# Patient Record
Sex: MALE | Race: WHITE | NOT HISPANIC OR LATINO | URBAN - METROPOLITAN AREA
[De-identification: names, ages, dates, MRNs, and addresses within clinical notes are randomized per-mention and may not be internally consistent; named-entity substitution may affect disease eponyms.]

---

## 2018-11-13 ENCOUNTER — INPATIENT (INPATIENT)
Facility: HOSPITAL | Age: 78
LOS: 6 days | Discharge: NURSING, REHAB OR LONG-TERM CARE | End: 2018-11-20
Attending: INTERNAL MEDICINE | Admitting: INTERNAL MEDICINE

## 2018-11-14 VITALS
HEART RATE: 84 BPM | DIASTOLIC BLOOD PRESSURE: 74 MMHG | SYSTOLIC BLOOD PRESSURE: 142 MMHG | OXYGEN SATURATION: 95 % | RESPIRATION RATE: 18 BRPM | TEMPERATURE: 97 F

## 2018-11-14 DIAGNOSIS — Z98.890 OTHER SPECIFIED POSTPROCEDURAL STATES: Chronic | ICD-10-CM

## 2018-11-14 LAB
ALBUMIN SERPL ELPH-MCNC: 4.1 G/DL — SIGNIFICANT CHANGE UP (ref 3.5–5.2)
ALP SERPL-CCNC: 109 U/L — SIGNIFICANT CHANGE UP (ref 30–115)
ALT FLD-CCNC: 35 U/L — SIGNIFICANT CHANGE UP (ref 0–41)
ANION GAP SERPL CALC-SCNC: 13 MMOL/L — SIGNIFICANT CHANGE UP (ref 7–14)
APTT BLD: 25.1 SEC — LOW (ref 27–39.2)
AST SERPL-CCNC: 36 U/L — SIGNIFICANT CHANGE UP (ref 0–41)
BASOPHILS # BLD AUTO: 0.08 K/UL — SIGNIFICANT CHANGE UP (ref 0–0.2)
BASOPHILS NFR BLD AUTO: 0.5 % — SIGNIFICANT CHANGE UP (ref 0–1)
BILIRUB SERPL-MCNC: 0.2 MG/DL — SIGNIFICANT CHANGE UP (ref 0.2–1.2)
BUN SERPL-MCNC: 29 MG/DL — HIGH (ref 10–20)
CALCIUM SERPL-MCNC: 8.8 MG/DL — SIGNIFICANT CHANGE UP (ref 8.5–10.1)
CHLORIDE SERPL-SCNC: 105 MMOL/L — SIGNIFICANT CHANGE UP (ref 98–110)
CO2 SERPL-SCNC: 23 MMOL/L — SIGNIFICANT CHANGE UP (ref 17–32)
CREAT SERPL-MCNC: 1.6 MG/DL — HIGH (ref 0.7–1.5)
EOSINOPHIL # BLD AUTO: 0.08 K/UL — SIGNIFICANT CHANGE UP (ref 0–0.7)
EOSINOPHIL NFR BLD AUTO: 0.5 % — SIGNIFICANT CHANGE UP (ref 0–8)
GLUCOSE SERPL-MCNC: 121 MG/DL — HIGH (ref 70–99)
HCT VFR BLD CALC: 39.9 % — LOW (ref 42–52)
HGB BLD-MCNC: 13.3 G/DL — LOW (ref 14–18)
IMM GRANULOCYTES NFR BLD AUTO: 0.4 % — HIGH (ref 0.1–0.3)
INR BLD: 1.08 RATIO — SIGNIFICANT CHANGE UP (ref 0.65–1.3)
LYMPHOCYTES # BLD AUTO: 1.72 K/UL — SIGNIFICANT CHANGE UP (ref 1.2–3.4)
LYMPHOCYTES # BLD AUTO: 11.7 % — LOW (ref 20.5–51.1)
MCHC RBC-ENTMCNC: 28.2 PG — SIGNIFICANT CHANGE UP (ref 27–31)
MCHC RBC-ENTMCNC: 33.3 G/DL — SIGNIFICANT CHANGE UP (ref 32–37)
MCV RBC AUTO: 84.7 FL — SIGNIFICANT CHANGE UP (ref 80–94)
MONOCYTES # BLD AUTO: 0.79 K/UL — HIGH (ref 0.1–0.6)
MONOCYTES NFR BLD AUTO: 5.4 % — SIGNIFICANT CHANGE UP (ref 1.7–9.3)
NEUTROPHILS # BLD AUTO: 11.92 K/UL — HIGH (ref 1.4–6.5)
NEUTROPHILS NFR BLD AUTO: 81.5 % — HIGH (ref 42.2–75.2)
NRBC # BLD: 0 /100 WBCS — SIGNIFICANT CHANGE UP (ref 0–0)
PLATELET # BLD AUTO: 146 K/UL — SIGNIFICANT CHANGE UP (ref 130–400)
POTASSIUM SERPL-MCNC: 4.5 MMOL/L — SIGNIFICANT CHANGE UP (ref 3.5–5)
POTASSIUM SERPL-SCNC: 4.5 MMOL/L — SIGNIFICANT CHANGE UP (ref 3.5–5)
PROT SERPL-MCNC: 6.6 G/DL — SIGNIFICANT CHANGE UP (ref 6–8)
PROTHROM AB SERPL-ACNC: 11.7 SEC — SIGNIFICANT CHANGE UP (ref 9.95–12.87)
RBC # BLD: 4.71 M/UL — SIGNIFICANT CHANGE UP (ref 4.7–6.1)
RBC # FLD: 16 % — HIGH (ref 11.5–14.5)
SODIUM SERPL-SCNC: 141 MMOL/L — SIGNIFICANT CHANGE UP (ref 135–146)
WBC # BLD: 14.65 K/UL — HIGH (ref 4.8–10.8)
WBC # FLD AUTO: 14.65 K/UL — HIGH (ref 4.8–10.8)

## 2018-11-14 RX ORDER — AMLODIPINE BESYLATE 2.5 MG/1
5 TABLET ORAL DAILY
Qty: 0 | Refills: 0 | Status: DISCONTINUED | OUTPATIENT
Start: 2018-11-14 | End: 2018-11-16

## 2018-11-14 RX ORDER — BRIGATINIB 90MG-180MG
0 KIT ORAL
Qty: 0 | Refills: 0 | COMMUNITY

## 2018-11-14 RX ORDER — MORPHINE SULFATE 50 MG/1
4 CAPSULE, EXTENDED RELEASE ORAL ONCE
Qty: 0 | Refills: 0 | Status: DISCONTINUED | OUTPATIENT
Start: 2018-11-14 | End: 2018-11-14

## 2018-11-14 RX ORDER — MORPHINE SULFATE 50 MG/1
3 CAPSULE, EXTENDED RELEASE ORAL ONCE
Qty: 0 | Refills: 0 | Status: DISCONTINUED | OUTPATIENT
Start: 2018-11-14 | End: 2018-11-14

## 2018-11-14 RX ORDER — MORPHINE SULFATE 50 MG/1
4 CAPSULE, EXTENDED RELEASE ORAL EVERY 4 HOURS
Qty: 0 | Refills: 0 | Status: DISCONTINUED | OUTPATIENT
Start: 2018-11-14 | End: 2018-11-15

## 2018-11-14 RX ORDER — MORPHINE SULFATE 50 MG/1
4 CAPSULE, EXTENDED RELEASE ORAL ONCE
Qty: 0 | Refills: 0 | Status: COMPLETED | OUTPATIENT
Start: 2018-11-14 | End: 2018-11-14

## 2018-11-14 RX ORDER — HEPARIN SODIUM 5000 [USP'U]/ML
5000 INJECTION INTRAVENOUS; SUBCUTANEOUS EVERY 8 HOURS
Qty: 0 | Refills: 0 | Status: DISCONTINUED | OUTPATIENT
Start: 2018-11-14 | End: 2018-11-16

## 2018-11-14 RX ORDER — HYDROMORPHONE HYDROCHLORIDE 2 MG/ML
2 INJECTION INTRAMUSCULAR; INTRAVENOUS; SUBCUTANEOUS ONCE
Qty: 0 | Refills: 0 | Status: DISCONTINUED | OUTPATIENT
Start: 2018-11-14 | End: 2018-11-14

## 2018-11-14 RX ORDER — SODIUM CHLORIDE 9 MG/ML
1000 INJECTION, SOLUTION INTRAVENOUS
Qty: 0 | Refills: 0 | Status: DISCONTINUED | OUTPATIENT
Start: 2018-11-14 | End: 2018-11-16

## 2018-11-14 RX ORDER — PANTOPRAZOLE SODIUM 20 MG/1
40 TABLET, DELAYED RELEASE ORAL
Qty: 0 | Refills: 0 | Status: DISCONTINUED | OUTPATIENT
Start: 2018-11-14 | End: 2018-11-16

## 2018-11-14 RX ORDER — HYDROMORPHONE HYDROCHLORIDE 2 MG/ML
1 INJECTION INTRAMUSCULAR; INTRAVENOUS; SUBCUTANEOUS ONCE
Qty: 0 | Refills: 0 | Status: DISCONTINUED | OUTPATIENT
Start: 2018-11-14 | End: 2018-11-14

## 2018-11-14 RX ORDER — ONDANSETRON 8 MG/1
4 TABLET, FILM COATED ORAL EVERY 8 HOURS
Qty: 0 | Refills: 0 | Status: DISCONTINUED | OUTPATIENT
Start: 2018-11-15 | End: 2018-11-16

## 2018-11-14 RX ORDER — ONDANSETRON 8 MG/1
4 TABLET, FILM COATED ORAL ONCE
Qty: 0 | Refills: 0 | Status: COMPLETED | OUTPATIENT
Start: 2018-11-14 | End: 2018-11-14

## 2018-11-14 RX ADMIN — HEPARIN SODIUM 5000 UNIT(S): 5000 INJECTION INTRAVENOUS; SUBCUTANEOUS at 15:26

## 2018-11-14 RX ADMIN — MORPHINE SULFATE 4 MILLIGRAM(S): 50 CAPSULE, EXTENDED RELEASE ORAL at 01:59

## 2018-11-14 RX ADMIN — MORPHINE SULFATE 4 MILLIGRAM(S): 50 CAPSULE, EXTENDED RELEASE ORAL at 16:24

## 2018-11-14 RX ADMIN — MORPHINE SULFATE 4 MILLIGRAM(S): 50 CAPSULE, EXTENDED RELEASE ORAL at 23:54

## 2018-11-14 RX ADMIN — MORPHINE SULFATE 4 MILLIGRAM(S): 50 CAPSULE, EXTENDED RELEASE ORAL at 15:49

## 2018-11-14 RX ADMIN — HYDROMORPHONE HYDROCHLORIDE 2 MILLIGRAM(S): 2 INJECTION INTRAMUSCULAR; INTRAVENOUS; SUBCUTANEOUS at 20:03

## 2018-11-14 RX ADMIN — HEPARIN SODIUM 5000 UNIT(S): 5000 INJECTION INTRAVENOUS; SUBCUTANEOUS at 23:25

## 2018-11-14 RX ADMIN — SODIUM CHLORIDE 75 MILLILITER(S): 9 INJECTION, SOLUTION INTRAVENOUS at 14:26

## 2018-11-14 RX ADMIN — MORPHINE SULFATE 4 MILLIGRAM(S): 50 CAPSULE, EXTENDED RELEASE ORAL at 23:24

## 2018-11-14 RX ADMIN — HYDROMORPHONE HYDROCHLORIDE 1 MILLIGRAM(S): 2 INJECTION INTRAMUSCULAR; INTRAVENOUS; SUBCUTANEOUS at 06:43

## 2018-11-14 RX ADMIN — ONDANSETRON 4 MILLIGRAM(S): 8 TABLET, FILM COATED ORAL at 23:23

## 2018-11-14 NOTE — H&P ADULT - HISTORY OF PRESENT ILLNESS
78 years old male pt with past medical hx of HTN for 3 years, anxiety, stage 4 lung carcinoma (non small cell carcinoma) on brigatinib for 6 months transferred from [facility] s/p mechanical fall yesterday.  Patient was in usual state till yesterday morning, when he tripped while turning around in his kitchen, he denies any preceding symptoms like chest pain, palpitation, dizziness, visual symptoms. After fall, he started having pain his right hip according to him 10/10 sharp pain, non radiating, was unable to move his right leg afterward, was not able to stand or walk later.  They called ambulance at 11pm, went to [facility], transferred to [place] at 4 am  In [facility] ER, xray pelvis showed fracture of right  femoral head.  he has on /off shortness of breath with minimal exertion for last 1 years, he saw his primary and was told because of fluid in lungs, is able to walk, needs help for his daily activities.  he denies any weight changes, no cough, is feeling weak and lethargic lately, also complaints of tingling sensation in his feet bilaterally after starting chemotherapy.  he denies any fever, no nausea, no vomiting, no urinary complaints

## 2018-11-14 NOTE — ED PROVIDER NOTE - MEDICAL DECISION MAKING DETAILS
78yM pmhx stage 4Lung Ca   HTN presents s/p mechanical fall - pt uses walker  and for long distances wheelchair -  tonight while getting out of bed,  went to turn , crossed his right foot over left  and fall backward -  family heard bang-  pt no LOC no vomitin g  no headache neck pain, pt with chronic paresthesias to feet, no  chest deshaun abdominal pain   pt c/o pain to right hip.  unable to walk afterwards. PE alert nontoxic  head atraumatic cvs rrr resp cta  abd soft nonteder   ttp right hip, externally rotated shortened  2 + DP pulse, left le from ankle knee hip, wrist elbow shoulder from -  no ecchymosis.  no vertebral or c spine tenderness -plan xray ct   xray right subcapital hip fracture -  transfer north  for ortho

## 2018-11-14 NOTE — H&P ADULT - ATTENDING COMMENTS
pt seen and examined independently, I have read and agree with above exam and poa,    c/o severe pain rle,    intermed risk for mod risk surgery with good functional capacity    proceed with or    add metoprolo for htn    case discussed in detail with kane

## 2018-11-14 NOTE — ED ADULT NURSE REASSESSMENT NOTE - NS ED NURSE REASSESS COMMENT FT1
patient is a south site transfer for ortho eval. pt A &ox4, VSS in triage. patient c/o nausea and right hip pain. patient has a 18g to the  AC from the [facility].

## 2018-11-14 NOTE — H&P ADULT - NSHPLABSRESULTS_GEN_ALL_CORE
11-14    141  |  105  |  29<H>  ----------------------------<  121<H>  4.5   |  23  |  1.6<H>    Ca    8.8      14 Nov 2018 02:30    TPro  6.6  /  Alb  4.1  /  TBili  0.2  /  DBili  x   /  AST  36  /  ALT  35  /  AlkPhos  109  11-14                          13.3   14.65 )-----------( 146      ( 14 Nov 2018 01:45 )             39.9     Xray Pelvis AP only (11.14.18 @ 02:11) >    Findings/  Impression:     Diffuse osteopenia.    There is an acute minimally displaced subcapital fracture of the right   femoral head.     Mild degenerative changes of the bilateral hips.    There is cortical thickening and enlargement of the right iliopectineal   line reflecting evidence of sagittal disease.       CT Cervical Spine No Cont (11.14.18 @ 05:40) >      IMPRESSION:    No evidence of a cervical spine fracture.    Trace anterolisthesis of C4 on C5 likely degenerative in etiology.    Straightening of the cervical lordosis secondary to positioning or muscle   spasm.    Moderate multilevel degenerative changes.     CT Head No Cont (11.14.18 @ 05:40) >      Impression:       No CT evidence of acute intracranial pathology.    Mild chronic microvascular ischemic changes.

## 2018-11-14 NOTE — ED PROVIDER NOTE - PHYSICAL EXAMINATION
Physical Exam    Vital Signs: I have reviewed the initial vital signs.  Constitutional: well-nourished, appears stated age, no acute distress  Eyes: Conjunctiva pink, Sclera clear, PERRLA, EOMI.  Cardiovascular: S1 and S2, regular rate, regular rhythm, well-perfused extremities, radial pulses equal and 2+  Respiratory: unlabored respiratory effort, clear to auscultation bilaterally no wheezing, rales and rhonchi  Gastrointestinal: soft, non-tender abdomen, no pulsatile mass, normal bowl sounds  Musculoskeletal: supple neck, no lower extremity edema, no midline tenderness + right leg shortened and externally rotated. + right hip tenderness. good and equal DP pulse.   Integumentary: warm, dry, no rash  Neurologic: awake, alert, cranial nerves II-XII grossly intact, extremities’ motor and sensory functions grossly intact  Psychiatric: appropriate mood, appropriate affect

## 2018-11-14 NOTE — H&P ADULT - NSHPSOCIALHISTORY_GEN_ALL_CORE
non smoker, occasional alcohol or beer once per week  denies any illicit drugs  retired, his nephew takes care of him

## 2018-11-14 NOTE — H&P ADULT - NSHPPHYSICALEXAM_GEN_ALL_CORE
Vital Signs Last 24 Hrs  T(C): 36.4 (14 Nov 2018 08:27), Max: 36.4 (14 Nov 2018 08:27)  T(F): 97.6 (14 Nov 2018 08:27), Max: 97.6 (14 Nov 2018 08:27)  HR: 78 (14 Nov 2018 08:27) (78 - 84)  BP: 169/75 (14 Nov 2018 08:27) (142/74 - 169/75)  RR: 18 (14 Nov 2018 08:27) (18 - 18)  SpO2: 95% (14 Nov 2018 08:27) (95% - 95%)      alert oriented , in acute pain  looks dehydrated on exam, dry mucus membrane  chest clear bilaterally  cvs s1 and s2 no added sound  abdomen soft lax no organomegaly  no pedal edema  unable to do both active and passive movements on right leg

## 2018-11-14 NOTE — ED PROVIDER NOTE - NS ED ROS FT
Constitutional: (-) fever  Eyes/ENT: (-) blurry vision, (-) epistaxis  Cardiovascular: (-) chest pain, (-) syncope  Respiratory: (-) cough, (-) shortness of breath  Gastrointestinal: (-) vomiting, (-) diarrhea  Musculoskeletal: (-) neck pain, (-) back pain, (+) joint pain  Integumentary: (-) rash, (-) edema  Neurological: (-) headache, (-) altered mental status  Psychiatric: (-) hallucinations  Allergic/Immunologic: (-) pruritus

## 2018-11-14 NOTE — ED PROVIDER NOTE - OBJECTIVE STATEMENT
78 year old male past medical history of stage 4 lung ca, Hypertension comes to the emergency room after trip and fall. tp states that he was walking with walker to get out of bed and fell. no head injury no loss of consciousness. patient was unable to stand and called for help. family found patient on floor and complaining of right hip pain. no neck or back pain. no chest pain no shortness of breath.

## 2018-11-14 NOTE — H&P ADULT - ASSESSMENT
78 years old male pt with past medical hx of HTN for 3 years, anxiety, stage 4 lung carcinoma (non small cell carcinoma) on brigatinib for 6 months transferred from [facility] s/p mechanical fall yesterday.    # s/p mechanical fall     right femoral head fracture     will consult orthopedics     morphine 4 mg iv q4 hourly PRN     will check vitamin d 25, TSH    # DIDI ? 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 s/p mechanical fall yesterday.    # s/p mechanical fall     right femoral head fracture     will consult orthopedics     diffuse osteopenia     morphine 4 mg iv q4 hourly PRN     will check vitamin d 25, TSH     need DEXA scan as outpt    # DIDI ? baseline     looks dehydrated on exam     will start 5 % dextrose with 0.45 NS at 75 cc/hour     will recheck bmp at 8 pm, if remain elevated will get sonogram for chronicity of disease      check urine electrolytes      # stage 4 non small cell carcinoma     on brigatinib     will hold for now    # DVT prophylaxis     on heparin 5000 units TID    # HTN     will start amlodipine 5 mg po once daily     hold bumex and quinapril for now, can restart on discharge  # diet DASH diet  # GI prophylaxis on pantoprazole  # full code  # activity bed rest for now

## 2018-11-15 LAB
24R-OH-CALCIDIOL SERPL-MCNC: 15 NG/ML — LOW (ref 30–80)
ANION GAP SERPL CALC-SCNC: 15 MMOL/L — HIGH (ref 7–14)
BLD GP AB SCN SERPL QL: SIGNIFICANT CHANGE UP
BUN SERPL-MCNC: 23 MG/DL — HIGH (ref 10–20)
CALCIUM SERPL-MCNC: 9.8 MG/DL — SIGNIFICANT CHANGE UP (ref 8.5–10.1)
CHLORIDE SERPL-SCNC: 103 MMOL/L — SIGNIFICANT CHANGE UP (ref 98–110)
CHOLEST SERPL-MCNC: 200 MG/DL — SIGNIFICANT CHANGE UP (ref 100–200)
CO2 SERPL-SCNC: 26 MMOL/L — SIGNIFICANT CHANGE UP (ref 17–32)
CREAT SERPL-MCNC: 1.5 MG/DL — SIGNIFICANT CHANGE UP (ref 0.7–1.5)
GLUCOSE SERPL-MCNC: 106 MG/DL — HIGH (ref 70–99)
HCT VFR BLD CALC: 40.9 % — LOW (ref 42–52)
HDLC SERPL-MCNC: 67 MG/DL — SIGNIFICANT CHANGE UP
HGB BLD-MCNC: 13.9 G/DL — LOW (ref 14–18)
LIPID PNL WITH DIRECT LDL SERPL: 122 MG/DL — SIGNIFICANT CHANGE UP (ref 4–129)
MAGNESIUM SERPL-MCNC: 2.3 MG/DL — SIGNIFICANT CHANGE UP (ref 1.8–2.4)
MCHC RBC-ENTMCNC: 28.6 PG — SIGNIFICANT CHANGE UP (ref 27–31)
MCHC RBC-ENTMCNC: 34 G/DL — SIGNIFICANT CHANGE UP (ref 32–37)
MCV RBC AUTO: 84.2 FL — SIGNIFICANT CHANGE UP (ref 80–94)
NRBC # BLD: 0 /100 WBCS — SIGNIFICANT CHANGE UP (ref 0–0)
PHOSPHATE SERPL-MCNC: 2.7 MG/DL — SIGNIFICANT CHANGE UP (ref 2.1–4.9)
PLATELET # BLD AUTO: 128 K/UL — LOW (ref 130–400)
POTASSIUM SERPL-MCNC: 5.1 MMOL/L — HIGH (ref 3.5–5)
POTASSIUM SERPL-SCNC: 5.1 MMOL/L — HIGH (ref 3.5–5)
RBC # BLD: 4.86 M/UL — SIGNIFICANT CHANGE UP (ref 4.7–6.1)
RBC # FLD: 16.2 % — HIGH (ref 11.5–14.5)
SODIUM SERPL-SCNC: 144 MMOL/L — SIGNIFICANT CHANGE UP (ref 135–146)
TOTAL CHOLESTEROL/HDL RATIO MEASUREMENT: 3 RATIO — LOW (ref 4–5.5)
TRIGL SERPL-MCNC: 110 MG/DL — SIGNIFICANT CHANGE UP (ref 10–149)
TYPE + AB SCN PNL BLD: SIGNIFICANT CHANGE UP
URATE SERPL-MCNC: 8.3 MG/DL — SIGNIFICANT CHANGE UP (ref 3.4–8.8)
WBC # BLD: 14.09 K/UL — HIGH (ref 4.8–10.8)
WBC # FLD AUTO: 14.09 K/UL — HIGH (ref 4.8–10.8)

## 2018-11-15 RX ORDER — MORPHINE SULFATE 50 MG/1
4 CAPSULE, EXTENDED RELEASE ORAL
Qty: 0 | Refills: 0 | Status: DISCONTINUED | OUTPATIENT
Start: 2018-11-15 | End: 2018-11-16

## 2018-11-15 RX ORDER — METOPROLOL TARTRATE 50 MG
25 TABLET ORAL
Qty: 0 | Refills: 0 | Status: DISCONTINUED | OUTPATIENT
Start: 2018-11-15 | End: 2018-11-16

## 2018-11-15 RX ADMIN — HEPARIN SODIUM 5000 UNIT(S): 5000 INJECTION INTRAVENOUS; SUBCUTANEOUS at 21:45

## 2018-11-15 RX ADMIN — ONDANSETRON 4 MILLIGRAM(S): 8 TABLET, FILM COATED ORAL at 21:46

## 2018-11-15 RX ADMIN — HEPARIN SODIUM 5000 UNIT(S): 5000 INJECTION INTRAVENOUS; SUBCUTANEOUS at 06:29

## 2018-11-15 RX ADMIN — MORPHINE SULFATE 4 MILLIGRAM(S): 50 CAPSULE, EXTENDED RELEASE ORAL at 07:29

## 2018-11-15 RX ADMIN — MORPHINE SULFATE 4 MILLIGRAM(S): 50 CAPSULE, EXTENDED RELEASE ORAL at 16:14

## 2018-11-15 RX ADMIN — MORPHINE SULFATE 4 MILLIGRAM(S): 50 CAPSULE, EXTENDED RELEASE ORAL at 12:03

## 2018-11-15 RX ADMIN — ONDANSETRON 4 MILLIGRAM(S): 8 TABLET, FILM COATED ORAL at 14:17

## 2018-11-15 RX ADMIN — MORPHINE SULFATE 4 MILLIGRAM(S): 50 CAPSULE, EXTENDED RELEASE ORAL at 16:40

## 2018-11-15 RX ADMIN — MORPHINE SULFATE 4 MILLIGRAM(S): 50 CAPSULE, EXTENDED RELEASE ORAL at 06:28

## 2018-11-15 RX ADMIN — AMLODIPINE BESYLATE 5 MILLIGRAM(S): 2.5 TABLET ORAL at 06:29

## 2018-11-15 RX ADMIN — Medication 25 MILLIGRAM(S): at 11:33

## 2018-11-15 RX ADMIN — HEPARIN SODIUM 5000 UNIT(S): 5000 INJECTION INTRAVENOUS; SUBCUTANEOUS at 14:18

## 2018-11-15 RX ADMIN — ONDANSETRON 4 MILLIGRAM(S): 8 TABLET, FILM COATED ORAL at 06:29

## 2018-11-15 RX ADMIN — Medication 25 MILLIGRAM(S): at 17:13

## 2018-11-15 RX ADMIN — MORPHINE SULFATE 4 MILLIGRAM(S): 50 CAPSULE, EXTENDED RELEASE ORAL at 11:33

## 2018-11-15 NOTE — CONSULT NOTE ADULT - ASSESSMENT
h/o b/l pleural effusion   dhf chronic   echo in office: normal lv function, lae lvef 60% DD2   ekg: nsr with pacs       pt is intermediate risk of perioperative cv complications and cv stable for hip surgeyr   continue metoprolol other meds   resume bumex 0.5 mg po qd   correct potassium   recall as needed.

## 2018-11-15 NOTE — PROGRESS NOTE ADULT - SUBJECTIVE AND OBJECTIVE BOX
Hospital Day:  1d    Subjective:      Past Medical Hx:   Lung cancer  Hypertension    Past Sx:  History of hemorrhoidectomy    Allergies:  No Known Allergies    Current Meds:   Standng Meds:  amLODIPine   Tablet 5 milliGRAM(s) Oral daily  dextrose 5% + sodium chloride 0.45%. 1000 milliLiter(s) (75 mL/Hr) IV Continuous <Continuous>  heparin  Injectable 5000 Unit(s) SubCutaneous every 8 hours  ondansetron    Tablet 4 milliGRAM(s) Oral every 8 hours  pantoprazole    Tablet 40 milliGRAM(s) Oral before breakfast    PRN Meds:  morphine  - Injectable 4 milliGRAM(s) IV Push every 4 hours PRN Severe Pain (7 - 10)    Vital Signs:   T(F): 98.5 (11-15-18 @ 07:15), Max: 99.2 (11-14-18 @ 16:21)  HR: 95 (11-15-18 @ 07:15) (93 - 104)  BP: 174/85 (11-15-18 @ 07:15) (128/72 - 177/82)  RR: 18 (11-15-18 @ 07:15) (18 - 18)  SpO2: 99% (11-14-18 @ 16:21) (99% - 99%)      11-14-18 @ 07:01  -  11-15-18 @ 07:00  --------------------------------------------------------  IN: 0 mL / OUT: 250 mL / NET: -250 mL        Physical Exam:   GENERAL: NAD, Lying in bed, pleasant and conversive.   HEENT: NCAT, PERRLA, EOMI  CHEST/LUNG: CTA, No wheezing, rhonchi, rales  HEART: Regular rate and rhythm; s1 s2 appreciated, No murmurs, rubs, or gallops  ABDOMEN: Soft, Nontender, Nondistended; Bowel sounds present  EXTREMITIES: No LE edema b/l, Peripheral pulses 2+, No cyanosis, no clubbing, Muscle strength 3/5 in RLE, 5/5 in LLE and in bilateral UE   NERVOUS SYSTEM:  Alert & Oriented X3, Cranial nerves ii-xii grossly intact, sensation equal and intact      Labs:                         13.9   14.09 )-----------( 128      ( 15 Nov 2018 06:05 )             40.9       15 Nov 2018 06:05    144    |  103    |  23     ----------------------------<  106    5.1     |  26     |  1.5      Ca    9.8        15 Nov 2018 06:05  Phos  2.7       15 Nov 2018 06:05  Mg     2.3       15 Nov 2018 06:05    TPro  6.6    /  Alb  4.1    /  TBili  0.2    /  DBili  x      /  AST  36     /  ALT  35     /  AlkPhos  109    14 Nov 2018 02:30      Chol 200, , HDL 67, VLDL --,  11-15-18 @ 06:05                        Radiology:       Assessment and Plan:     DVT ppx:    GI ppx:     Code Status:     DISPO: Hospital Day:  1d    Subjective:  No overnight events. Seen and examined at bedside this morning. Patient reports that he is having difficulty with moving his leg. Noted that when he initially fell he was unable to wiggle his toes. Reported that he has since been able to regain movements in his lower leg. Stated no fevers, chills, nausea, vomiting, constipation, diarrhea, weakness, fatigue, chest pain shortness of breath.     Past Medical Hx:   Lung cancer  Hypertension    Past Sx:  History of hemorrhoidectomy    Allergies:  No Known Allergies    Current Meds:   Standng Meds:  amLODIPine   Tablet 5 milliGRAM(s) Oral daily  dextrose 5% + sodium chloride 0.45%. 1000 milliLiter(s) (75 mL/Hr) IV Continuous <Continuous>  heparin  Injectable 5000 Unit(s) SubCutaneous every 8 hours  ondansetron    Tablet 4 milliGRAM(s) Oral every 8 hours  pantoprazole    Tablet 40 milliGRAM(s) Oral before breakfast    PRN Meds:  morphine  - Injectable 4 milliGRAM(s) IV Push every 4 hours PRN Severe Pain (7 - 10)    Vital Signs:   T(F): 98.5 (11-15-18 @ 07:15), Max: 99.2 (11-14-18 @ 16:21)  HR: 95 (11-15-18 @ 07:15) (93 - 104)  BP: 174/85 (11-15-18 @ 07:15) (128/72 - 177/82)  RR: 18 (11-15-18 @ 07:15) (18 - 18)  SpO2: 99% (11-14-18 @ 16:21) (99% - 99%)      11-14-18 @ 07:01  -  11-15-18 @ 07:00  --------------------------------------------------------  IN: 0 mL / OUT: 250 mL / NET: -250 mL        Physical Exam:   GENERAL: NAD, Lying in bed, pleasant and conversive.   HEENT: NCAT, PERRLA, EOMI  CHEST/LUNG: CTA, No wheezing, rhonchi, rales  HEART: Regular rate and rhythm; s1 s2 appreciated, No murmurs, rubs, or gallops  ABDOMEN: Soft, Nontender, Nondistended; Bowel sounds present  EXTREMITIES: No LE edema b/l, Peripheral pulses 2+, No cyanosis, no clubbing, Muscle strength 3/5 in RLE, 5/5 in LLE and in bilateral UE   NERVOUS SYSTEM:  Alert & Oriented X3, Cranial nerves ii-xii grossly intact, sensation equal and intact      Labs:                         13.9   14.09 )-----------( 128      ( 15 Nov 2018 06:05 )             40.9       15 Nov 2018 06:05    144    |  103    |  23     ----------------------------<  106    5.1     |  26     |  1.5      Ca    9.8        15 Nov 2018 06:05  Phos  2.7       15 Nov 2018 06:05  Mg     2.3       15 Nov 2018 06:05    TPro  6.6    /  Alb  4.1    /  TBili  0.2    /  DBili  x      /  AST  36     /  ALT  35     /  AlkPhos  109    14 Nov 2018 02:30      Chol 200, , HDL 67, VLDL --,  11-15-18 @ 06:05    Radiology:   Xray Hip 1 View, Right (11.14.18 @ 02:11)  Diffuse osteopenia.    There is an acute minimally displaced subcapital fracture of the right   femoral head.     Mild degenerative changes of the bilateral hips.    There is cortical thickening and enlargement of the right iliopectineal   line reflecting evidence of sagittal disease.    Assessment and Plan:   This is a 78 year old male with PMHx of HTN, Anxiety, Stage 4 lung carcinoma (Non small cell) on brigatinib for 67months who initially presented to [facility] /2 mechanical fall. Patient was transferred to the [facility] after being found to have a right femoral head fracture.     #Right femoral head fracture 2/2 mechanical fall   - Orthopedics consulted; plan for OR tomorrow. Will need cardiac and medical clearance   - Pain control: Morphine 4mg IV q4 PRN  - Vitamin D and TSH pending    #Diffuse Osteopenia  - Outpatient DEXA    #DIDI vs CKD  - No history of CKD.  - Continue on D5 1/2NS @75 cc/hour  - Urine studies ordered today    #Stage 4 Non Small Cell Lung Cancer  - Taking Brigatinib at home, on hold while in patient    #HTN  - On amlodpine 5mg daily while inpatient, will increase dose for better BP control.  - Home meds to be resumed on discharge    Activity: Bedrest  Diet: DASH  DVT ppx: Heparin SubQ  GI ppx: Protonix  Code Status: Full Code  Dispo: To OR tomorrow. Needs medical and cardiac attending clearance. Hospital Day:  1d    Subjective:  No overnight events. Seen and examined at bedside this morning. Patient reports that he is having difficulty with moving his leg. Noted that when he initially fell he was unable to wiggle his toes. Reported that he has since been able to regain movements in his lower leg. Stated no fevers, chills, nausea, vomiting, constipation, diarrhea, weakness, fatigue, chest pain shortness of breath.     Past Medical Hx:   Lung cancer  Hypertension    Past Sx:  History of hemorrhoidectomy    Allergies:  No Known Allergies    Current Meds:   Standng Meds:  amLODIPine   Tablet 5 milliGRAM(s) Oral daily  dextrose 5% + sodium chloride 0.45%. 1000 milliLiter(s) (75 mL/Hr) IV Continuous <Continuous>  heparin  Injectable 5000 Unit(s) SubCutaneous every 8 hours  ondansetron    Tablet 4 milliGRAM(s) Oral every 8 hours  pantoprazole    Tablet 40 milliGRAM(s) Oral before breakfast    PRN Meds:  morphine  - Injectable 4 milliGRAM(s) IV Push every 4 hours PRN Severe Pain (7 - 10)    Vital Signs:   T(F): 98.5 (11-15-18 @ 07:15), Max: 99.2 (11-14-18 @ 16:21)  HR: 95 (11-15-18 @ 07:15) (93 - 104)  BP: 174/85 (11-15-18 @ 07:15) (128/72 - 177/82)  RR: 18 (11-15-18 @ 07:15) (18 - 18)  SpO2: 99% (11-14-18 @ 16:21) (99% - 99%)      11-14-18 @ 07:01  -  11-15-18 @ 07:00  --------------------------------------------------------  IN: 0 mL / OUT: 250 mL / NET: -250 mL        Physical Exam:   GENERAL: NAD, Lying in bed, pleasant and conversive.   HEENT: NCAT, PERRLA, EOMI  CHEST/LUNG: CTA, No wheezing, rhonchi, rales  HEART: Regular rate and rhythm; s1 s2 appreciated, No murmurs, rubs, or gallops  ABDOMEN: Soft, Nontender, Nondistended; Bowel sounds present  EXTREMITIES: No LE edema b/l, Peripheral pulses 2+, No cyanosis, no clubbing, Muscle strength 3/5 in RLE, 5/5 in LLE and in bilateral UE   NERVOUS SYSTEM:  Alert & Oriented X3, Cranial nerves ii-xii grossly intact, sensation equal and intact      Labs:                         13.9   14.09 )-----------( 128      ( 15 Nov 2018 06:05 )             40.9       15 Nov 2018 06:05    144    |  103    |  23     ----------------------------<  106    5.1     |  26     |  1.5      Ca    9.8        15 Nov 2018 06:05  Phos  2.7       15 Nov 2018 06:05  Mg     2.3       15 Nov 2018 06:05    TPro  6.6    /  Alb  4.1    /  TBili  0.2    /  DBili  x      /  AST  36     /  ALT  35     /  AlkPhos  109    14 Nov 2018 02:30      Chol 200, , HDL 67, VLDL --,  11-15-18 @ 06:05    Radiology:   Xray Hip 1 View, Right (11.14.18 @ 02:11)  Diffuse osteopenia.    There is an acute minimally displaced subcapital fracture of the right   femoral head.     Mild degenerative changes of the bilateral hips.    There is cortical thickening and enlargement of the right iliopectineal   line reflecting evidence of sagittal disease.    Assessment and Plan:   This is a 78 year old male with PMHx of HTN, Anxiety, Stage 4 lung carcinoma (Non small cell) on brigatinib for 67months who initially presented to [facility] /2 mechanical fall. Patient was transferred to the [facility] after being found to have a right femoral head fracture.     #Right femoral head fracture 2/2 mechanical fall   - Orthopedics consulted; plan for OR tomorrow. Will need cardiac and medical clearance   - Pain control: Morphine 4mg IV q4 PRN  - Vitamin D and TSH pending    #Diffuse Osteopenia  - Outpatient DEXA    #DIDI vs CKD  - No history of CKD.  - Continue on D5 1/2NS @75 cc/hour  - Urine studies ordered today    #Stage 4 Non Small Cell Lung Cancer  - Taking Brigatinib at home, on hold while in patient    #HTN  - On amlodpine 5mg daily and Metoprolol 25mg BID while inpatient  - Continue to monitor while inpatient.  - Home meds to be resumed on discharge    Activity: Bedrest  Diet: DASH  DVT ppx: Heparin SubQ  GI ppx: Protonix  Code Status: Full Code  Dispo: To OR tomorrow. Needs medical and cardiac attending clearance as per Ortho.

## 2018-11-15 NOTE — CONSULT NOTE ADULT - ASSESSMENT
DIDI - cr better  possible CKD 3  mild hyperkalemia  fall  right hip fracture  chronic CHF   HTN  NSCLCA stage 4    plan:    gentle ivf while NPO  resume bumex 24-48 hours after OR  hold ace-I and kcl  cont norvasc  monitor bp's  trend renal fx and lytes  check ua, uprot/cr ratio  check renal sono

## 2018-11-16 LAB
ANION GAP SERPL CALC-SCNC: 17 MMOL/L — HIGH (ref 7–14)
ANION GAP SERPL CALC-SCNC: 21 MMOL/L — HIGH (ref 7–14)
APPEARANCE UR: CLEAR — SIGNIFICANT CHANGE UP
APTT BLD: 30.6 SEC — SIGNIFICANT CHANGE UP (ref 27–39.2)
BASOPHILS # BLD AUTO: 0.03 K/UL — SIGNIFICANT CHANGE UP (ref 0–0.2)
BASOPHILS # BLD AUTO: 0.04 K/UL — SIGNIFICANT CHANGE UP (ref 0–0.2)
BASOPHILS NFR BLD AUTO: 0.2 % — SIGNIFICANT CHANGE UP (ref 0–1)
BASOPHILS NFR BLD AUTO: 0.3 % — SIGNIFICANT CHANGE UP (ref 0–1)
BILIRUB UR-MCNC: NEGATIVE — SIGNIFICANT CHANGE UP
BUN SERPL-MCNC: 26 MG/DL — HIGH (ref 10–20)
BUN SERPL-MCNC: 31 MG/DL — HIGH (ref 10–20)
CALCIUM SERPL-MCNC: 10 MG/DL — SIGNIFICANT CHANGE UP (ref 8.5–10.1)
CALCIUM SERPL-MCNC: 9.2 MG/DL — SIGNIFICANT CHANGE UP (ref 8.5–10.1)
CHLORIDE SERPL-SCNC: 102 MMOL/L — SIGNIFICANT CHANGE UP (ref 98–110)
CHLORIDE SERPL-SCNC: 99 MMOL/L — SIGNIFICANT CHANGE UP (ref 98–110)
CO2 SERPL-SCNC: 22 MMOL/L — SIGNIFICANT CHANGE UP (ref 17–32)
CO2 SERPL-SCNC: 23 MMOL/L — SIGNIFICANT CHANGE UP (ref 17–32)
COLOR SPEC: YELLOW — SIGNIFICANT CHANGE UP
CREAT SERPL-MCNC: 1.5 MG/DL — SIGNIFICANT CHANGE UP (ref 0.7–1.5)
CREAT SERPL-MCNC: 1.6 MG/DL — HIGH (ref 0.7–1.5)
DIFF PNL FLD: ABNORMAL
EOSINOPHIL # BLD AUTO: 0.01 K/UL — SIGNIFICANT CHANGE UP (ref 0–0.7)
EOSINOPHIL # BLD AUTO: 0.02 K/UL — SIGNIFICANT CHANGE UP (ref 0–0.7)
EOSINOPHIL NFR BLD AUTO: 0.1 % — SIGNIFICANT CHANGE UP (ref 0–8)
EOSINOPHIL NFR BLD AUTO: 0.1 % — SIGNIFICANT CHANGE UP (ref 0–8)
GLUCOSE BLDC GLUCOMTR-MCNC: 95 MG/DL — SIGNIFICANT CHANGE UP (ref 70–99)
GLUCOSE SERPL-MCNC: 114 MG/DL — HIGH (ref 70–99)
GLUCOSE SERPL-MCNC: 114 MG/DL — HIGH (ref 70–99)
GLUCOSE UR QL: NEGATIVE MG/DL — SIGNIFICANT CHANGE UP
HCT VFR BLD CALC: 42.6 % — SIGNIFICANT CHANGE UP (ref 42–52)
HCT VFR BLD CALC: 45.7 % — SIGNIFICANT CHANGE UP (ref 42–52)
HGB BLD-MCNC: 14 G/DL — SIGNIFICANT CHANGE UP (ref 14–18)
HGB BLD-MCNC: 15 G/DL — SIGNIFICANT CHANGE UP (ref 14–18)
IMM GRANULOCYTES NFR BLD AUTO: 0.4 % — HIGH (ref 0.1–0.3)
IMM GRANULOCYTES NFR BLD AUTO: 0.4 % — HIGH (ref 0.1–0.3)
INR BLD: 1.03 RATIO — SIGNIFICANT CHANGE UP (ref 0.65–1.3)
KETONES UR-MCNC: NEGATIVE — SIGNIFICANT CHANGE UP
LEUKOCYTE ESTERASE UR-ACNC: NEGATIVE — SIGNIFICANT CHANGE UP
LYMPHOCYTES # BLD AUTO: 0.73 K/UL — LOW (ref 1.2–3.4)
LYMPHOCYTES # BLD AUTO: 0.95 K/UL — LOW (ref 1.2–3.4)
LYMPHOCYTES # BLD AUTO: 5.2 % — LOW (ref 20.5–51.1)
LYMPHOCYTES # BLD AUTO: 6.1 % — LOW (ref 20.5–51.1)
MCHC RBC-ENTMCNC: 27.8 PG — SIGNIFICANT CHANGE UP (ref 27–31)
MCHC RBC-ENTMCNC: 28.3 PG — SIGNIFICANT CHANGE UP (ref 27–31)
MCHC RBC-ENTMCNC: 32.8 G/DL — SIGNIFICANT CHANGE UP (ref 32–37)
MCHC RBC-ENTMCNC: 32.9 G/DL — SIGNIFICANT CHANGE UP (ref 32–37)
MCV RBC AUTO: 84.6 FL — SIGNIFICANT CHANGE UP (ref 80–94)
MCV RBC AUTO: 86.2 FL — SIGNIFICANT CHANGE UP (ref 80–94)
MONOCYTES # BLD AUTO: 0.62 K/UL — HIGH (ref 0.1–0.6)
MONOCYTES # BLD AUTO: 1.18 K/UL — HIGH (ref 0.1–0.6)
MONOCYTES NFR BLD AUTO: 4.4 % — SIGNIFICANT CHANGE UP (ref 1.7–9.3)
MONOCYTES NFR BLD AUTO: 7.6 % — SIGNIFICANT CHANGE UP (ref 1.7–9.3)
NEUTROPHILS # BLD AUTO: 12.69 K/UL — HIGH (ref 1.4–6.5)
NEUTROPHILS # BLD AUTO: 13.33 K/UL — HIGH (ref 1.4–6.5)
NEUTROPHILS NFR BLD AUTO: 85.6 % — HIGH (ref 42.2–75.2)
NEUTROPHILS NFR BLD AUTO: 89.6 % — HIGH (ref 42.2–75.2)
NITRITE UR-MCNC: NEGATIVE — SIGNIFICANT CHANGE UP
NRBC # BLD: 0 /100 WBCS — SIGNIFICANT CHANGE UP (ref 0–0)
PH UR: 5.5 — SIGNIFICANT CHANGE UP (ref 5–8)
PLATELET # BLD AUTO: 132 K/UL — SIGNIFICANT CHANGE UP (ref 130–400)
PLATELET # BLD AUTO: 179 K/UL — SIGNIFICANT CHANGE UP (ref 130–400)
POTASSIUM SERPL-MCNC: 4 MMOL/L — SIGNIFICANT CHANGE UP (ref 3.5–5)
POTASSIUM SERPL-MCNC: 4.4 MMOL/L — SIGNIFICANT CHANGE UP (ref 3.5–5)
POTASSIUM SERPL-SCNC: 4 MMOL/L — SIGNIFICANT CHANGE UP (ref 3.5–5)
POTASSIUM SERPL-SCNC: 4.4 MMOL/L — SIGNIFICANT CHANGE UP (ref 3.5–5)
PROT UR-MCNC: 100 MG/DL
PROTHROM AB SERPL-ACNC: 11.8 SEC — SIGNIFICANT CHANGE UP (ref 9.95–12.87)
RBC # BLD: 4.94 M/UL — SIGNIFICANT CHANGE UP (ref 4.7–6.1)
RBC # BLD: 5.4 M/UL — SIGNIFICANT CHANGE UP (ref 4.7–6.1)
RBC # FLD: 16.2 % — HIGH (ref 11.5–14.5)
RBC # FLD: 16.2 % — HIGH (ref 11.5–14.5)
SODIUM SERPL-SCNC: 141 MMOL/L — SIGNIFICANT CHANGE UP (ref 135–146)
SODIUM SERPL-SCNC: 143 MMOL/L — SIGNIFICANT CHANGE UP (ref 135–146)
SP GR SPEC: 1.02 — SIGNIFICANT CHANGE UP (ref 1.01–1.03)
TSH SERPL-MCNC: 6.06 UIU/ML — HIGH (ref 0.27–4.2)
UROBILINOGEN FLD QL: 0.2 MG/DL — SIGNIFICANT CHANGE UP (ref 0.2–0.2)
VIT B12 SERPL-MCNC: 286 PG/ML — SIGNIFICANT CHANGE UP (ref 232–1245)
WBC # BLD: 14.14 K/UL — HIGH (ref 4.8–10.8)
WBC # BLD: 15.58 K/UL — HIGH (ref 4.8–10.8)
WBC # FLD AUTO: 14.14 K/UL — HIGH (ref 4.8–10.8)
WBC # FLD AUTO: 15.58 K/UL — HIGH (ref 4.8–10.8)

## 2018-11-16 RX ORDER — POLYETHYLENE GLYCOL 3350 17 G/17G
17 POWDER, FOR SOLUTION ORAL
Qty: 0 | Refills: 0 | Status: DISCONTINUED | OUTPATIENT
Start: 2018-11-16 | End: 2018-11-16

## 2018-11-16 RX ORDER — DOCUSATE SODIUM 100 MG
100 CAPSULE ORAL
Qty: 0 | Refills: 0 | Status: DISCONTINUED | OUTPATIENT
Start: 2018-11-16 | End: 2018-11-20

## 2018-11-16 RX ORDER — ONDANSETRON 8 MG/1
4 TABLET, FILM COATED ORAL EVERY 8 HOURS
Qty: 0 | Refills: 0 | Status: DISCONTINUED | OUTPATIENT
Start: 2018-11-16 | End: 2018-11-20

## 2018-11-16 RX ORDER — PANTOPRAZOLE SODIUM 20 MG/1
40 TABLET, DELAYED RELEASE ORAL
Qty: 0 | Refills: 0 | Status: DISCONTINUED | OUTPATIENT
Start: 2018-11-16 | End: 2018-11-20

## 2018-11-16 RX ORDER — OXYCODONE HYDROCHLORIDE 5 MG/1
5 TABLET ORAL ONCE
Qty: 0 | Refills: 0 | Status: DISCONTINUED | OUTPATIENT
Start: 2018-11-16 | End: 2018-11-16

## 2018-11-16 RX ORDER — HEPARIN SODIUM 5000 [USP'U]/ML
5000 INJECTION INTRAVENOUS; SUBCUTANEOUS EVERY 8 HOURS
Qty: 0 | Refills: 0 | Status: DISCONTINUED | OUTPATIENT
Start: 2018-11-16 | End: 2018-11-20

## 2018-11-16 RX ORDER — DOCUSATE SODIUM 100 MG
100 CAPSULE ORAL
Qty: 0 | Refills: 0 | Status: DISCONTINUED | OUTPATIENT
Start: 2018-11-16 | End: 2018-11-16

## 2018-11-16 RX ORDER — MORPHINE SULFATE 50 MG/1
4 CAPSULE, EXTENDED RELEASE ORAL
Qty: 0 | Refills: 0 | Status: DISCONTINUED | OUTPATIENT
Start: 2018-11-16 | End: 2018-11-20

## 2018-11-16 RX ORDER — SENNA PLUS 8.6 MG/1
2 TABLET ORAL AT BEDTIME
Qty: 0 | Refills: 0 | Status: DISCONTINUED | OUTPATIENT
Start: 2018-11-16 | End: 2018-11-16

## 2018-11-16 RX ORDER — SODIUM CHLORIDE 9 MG/ML
1000 INJECTION, SOLUTION INTRAVENOUS
Qty: 0 | Refills: 0 | Status: DISCONTINUED | OUTPATIENT
Start: 2018-11-16 | End: 2018-11-16

## 2018-11-16 RX ORDER — METOPROLOL TARTRATE 50 MG
25 TABLET ORAL
Qty: 0 | Refills: 0 | Status: DISCONTINUED | OUTPATIENT
Start: 2018-11-16 | End: 2018-11-20

## 2018-11-16 RX ORDER — POLYETHYLENE GLYCOL 3350 17 G/17G
17 POWDER, FOR SOLUTION ORAL
Qty: 0 | Refills: 0 | Status: DISCONTINUED | OUTPATIENT
Start: 2018-11-16 | End: 2018-11-20

## 2018-11-16 RX ORDER — SENNA PLUS 8.6 MG/1
2 TABLET ORAL AT BEDTIME
Qty: 0 | Refills: 0 | Status: DISCONTINUED | OUTPATIENT
Start: 2018-11-16 | End: 2018-11-20

## 2018-11-16 RX ORDER — MORPHINE SULFATE 50 MG/1
4 CAPSULE, EXTENDED RELEASE ORAL
Qty: 0 | Refills: 0 | Status: DISCONTINUED | OUTPATIENT
Start: 2018-11-16 | End: 2018-11-16

## 2018-11-16 RX ORDER — HYDROMORPHONE HYDROCHLORIDE 2 MG/ML
0.5 INJECTION INTRAMUSCULAR; INTRAVENOUS; SUBCUTANEOUS
Qty: 0 | Refills: 0 | Status: DISCONTINUED | OUTPATIENT
Start: 2018-11-16 | End: 2018-11-16

## 2018-11-16 RX ORDER — POLYMYXIN B SULF/TRIMETHOPRIM 10000-1/ML
1 DROPS OPHTHALMIC (EYE)
Qty: 0 | Refills: 0 | Status: DISCONTINUED | OUTPATIENT
Start: 2018-11-16 | End: 2018-11-16

## 2018-11-16 RX ORDER — OXYCODONE HYDROCHLORIDE 5 MG/1
10 TABLET ORAL EVERY 4 HOURS
Qty: 0 | Refills: 0 | Status: DISCONTINUED | OUTPATIENT
Start: 2018-11-16 | End: 2018-11-20

## 2018-11-16 RX ORDER — ONDANSETRON 8 MG/1
4 TABLET, FILM COATED ORAL ONCE
Qty: 0 | Refills: 0 | Status: DISCONTINUED | OUTPATIENT
Start: 2018-11-16 | End: 2018-11-16

## 2018-11-16 RX ORDER — CEFAZOLIN SODIUM 1 G
2000 VIAL (EA) INJECTION EVERY 8 HOURS
Qty: 0 | Refills: 0 | Status: COMPLETED | OUTPATIENT
Start: 2018-11-16 | End: 2018-11-17

## 2018-11-16 RX ORDER — POLYMYXIN B SULF/TRIMETHOPRIM 10000-1/ML
1 DROPS OPHTHALMIC (EYE)
Qty: 0 | Refills: 0 | Status: DISCONTINUED | OUTPATIENT
Start: 2018-11-16 | End: 2018-11-20

## 2018-11-16 RX ORDER — OXYCODONE HYDROCHLORIDE 5 MG/1
5 TABLET ORAL EVERY 4 HOURS
Qty: 0 | Refills: 0 | Status: DISCONTINUED | OUTPATIENT
Start: 2018-11-16 | End: 2018-11-20

## 2018-11-16 RX ORDER — AMLODIPINE BESYLATE 2.5 MG/1
5 TABLET ORAL DAILY
Qty: 0 | Refills: 0 | Status: DISCONTINUED | OUTPATIENT
Start: 2018-11-16 | End: 2018-11-20

## 2018-11-16 RX ADMIN — HEPARIN SODIUM 5000 UNIT(S): 5000 INJECTION INTRAVENOUS; SUBCUTANEOUS at 21:51

## 2018-11-16 RX ADMIN — HEPARIN SODIUM 5000 UNIT(S): 5000 INJECTION INTRAVENOUS; SUBCUTANEOUS at 05:33

## 2018-11-16 RX ADMIN — PANTOPRAZOLE SODIUM 40 MILLIGRAM(S): 20 TABLET, DELAYED RELEASE ORAL at 05:32

## 2018-11-16 RX ADMIN — MORPHINE SULFATE 4 MILLIGRAM(S): 50 CAPSULE, EXTENDED RELEASE ORAL at 00:38

## 2018-11-16 RX ADMIN — SENNA PLUS 2 TABLET(S): 8.6 TABLET ORAL at 21:55

## 2018-11-16 RX ADMIN — Medication 100 MILLIGRAM(S): at 21:50

## 2018-11-16 RX ADMIN — ONDANSETRON 4 MILLIGRAM(S): 8 TABLET, FILM COATED ORAL at 05:34

## 2018-11-16 RX ADMIN — AMLODIPINE BESYLATE 5 MILLIGRAM(S): 2.5 TABLET ORAL at 05:32

## 2018-11-16 RX ADMIN — Medication 1 DROP(S): at 18:54

## 2018-11-16 RX ADMIN — SODIUM CHLORIDE 75 MILLILITER(S): 9 INJECTION, SOLUTION INTRAVENOUS at 15:41

## 2018-11-16 RX ADMIN — Medication 25 MILLIGRAM(S): at 05:32

## 2018-11-16 RX ADMIN — MORPHINE SULFATE 4 MILLIGRAM(S): 50 CAPSULE, EXTENDED RELEASE ORAL at 00:23

## 2018-11-16 RX ADMIN — Medication 1 DROP(S): at 21:49

## 2018-11-16 NOTE — BRIEF OPERATIVE NOTE - PROCEDURE
<<-----Click on this checkbox to enter Procedure Bipolar hemiarthroplasty of right hip by anterior approach  11/16/2018    Active  [id number]

## 2018-11-16 NOTE — PROGRESS NOTE ADULT - SUBJECTIVE AND OBJECTIVE BOX
NEPHROLOGY FOLLOW UP NOTE    pt seen and examined  d/w team and ortho  still with hip pain  cr stable    PAST MEDICAL & SURGICAL HISTORY:  Lung cancer  Hypertension  History of hemorrhoidectomy    Allergies:  No Known Allergies    Home Medications Reviewed    SOCIAL HISTORY:  Denies ETOH,Smoking,   FAMILY HISTORY:  No pertinent family history in first degree relatives        REVIEW OF SYSTEMS:  All other review of systems is negative unless indicated above.    PHYSICAL EXAM:  NAD  awake and alert  moist mm  no jvd  cat bl  rrr  soft, nt  right leg shortened and rotated  no cvat  no rash    Hospital Medications:   MEDICATIONS  (STANDING):  amLODIPine   Tablet 5 milliGRAM(s) Oral daily  ceFAZolin   IVPB 2000 milliGRAM(s) IV Intermittent every 8 hours  docusate sodium 100 milliGRAM(s) Oral two times a day  heparin  Injectable 5000 Unit(s) SubCutaneous every 8 hours  lactated ringers. 1000 milliLiter(s) (75 mL/Hr) IV Continuous <Continuous>  lactated ringers. 1000 milliLiter(s) (100 mL/Hr) IV Continuous <Continuous>  metoprolol tartrate 25 milliGRAM(s) Oral two times a day  ondansetron    Tablet 4 milliGRAM(s) Oral every 8 hours  pantoprazole    Tablet 40 milliGRAM(s) Oral before breakfast  polyethylene glycol 3350 17 Gram(s) Oral two times a day  senna 2 Tablet(s) Oral at bedtime  trimethoprim/polymyxin Solution 1 Drop(s) Both EYES every 3 hours        VITALS:  T(F): 97.5 (18 @ 16:41), Max: 99.7 (18 @ 00:00)  HR: 64 (18 @ 16:41)  BP: 116/62 (18 @ 16:41)  RR: 18 (18 @ 16:41)  SpO2: 99% (18 @ 16:41)  Wt(kg): --     @ 07:01  -  11-15 @ 07:00  --------------------------------------------------------  IN: 0 mL / OUT: 250 mL / NET: -250 mL    11-15 @ 07:01  -   @ 07:00  --------------------------------------------------------  IN: 300 mL / OUT: 0 mL / NET: 300 mL     @ 07:01  -   @ 16:55  --------------------------------------------------------  IN: 75 mL / OUT: 0 mL / NET: 75 mL      Height (cm): 167.64 ( 12:34)  Weight (kg): 75 ( 12:34)  BMI (kg/m2): 26.7 ( 12:34)  BSA (m2): 1.84 ( 12:34)    LABS:      143  |  99  |  26<H>  ----------------------------<  114<H>  4.4   |  23  |  1.5    Ca    10.0      2018 05:10  Phos  2.7     11-15  Mg     2.3     -15                            14.0   15.58 )-----------( 179      ( 2018 16:15 )             42.6       Urine Studies:  Urinalysis Basic - ( 2018 11:00 )    Color: Yellow / Appearance: Clear / S.020 / pH:   Gluc:  / Ketone: Negative  / Bili: Negative / Urobili: 0.2 mg/dL   Blood:  / Protein: 100 mg/dL / Nitrite: Negative   Leuk Esterase: Negative / RBC:  / WBC    Sq Epi:  / Non Sq Epi:  / Bacteria:           RADIOLOGY & ADDITIONAL STUDIES: NEPHROLOGY FOLLOW UP NOTE    pt seen and examined  d/w team and ortho  still with hip pain  cr stable    PAST MEDICAL & SURGICAL HISTORY:  Lung cancer  Hypertension  History of hemorrhoidectomy    Allergies:  No Known Allergies    Home Medications Reviewed    SOCIAL HISTORY:  Denies ETOH,Smoking,   FAMILY HISTORY:  No pertinent family history in first degree relatives        REVIEW OF SYSTEMS:  All other review of systems is negative unless indicated above.    advance care planning reviewed and d/w pt in detail    PHYSICAL EXAM:  NAD  awake and alert  moist mm  no jvd  cat bl  rrr  soft, nt  right leg shortened and rotated  no cvat  no rash    Hospital Medications:   MEDICATIONS  (STANDING):  amLODIPine   Tablet 5 milliGRAM(s) Oral daily  ceFAZolin   IVPB 2000 milliGRAM(s) IV Intermittent every 8 hours  docusate sodium 100 milliGRAM(s) Oral two times a day  heparin  Injectable 5000 Unit(s) SubCutaneous every 8 hours  lactated ringers. 1000 milliLiter(s) (75 mL/Hr) IV Continuous <Continuous>  lactated ringers. 1000 milliLiter(s) (100 mL/Hr) IV Continuous <Continuous>  metoprolol tartrate 25 milliGRAM(s) Oral two times a day  ondansetron    Tablet 4 milliGRAM(s) Oral every 8 hours  pantoprazole    Tablet 40 milliGRAM(s) Oral before breakfast  polyethylene glycol 3350 17 Gram(s) Oral two times a day  senna 2 Tablet(s) Oral at bedtime  trimethoprim/polymyxin Solution 1 Drop(s) Both EYES every 3 hours        VITALS:  T(F): 97.5 (18 @ 16:41), Max: 99.7 (18 @ 00:00)  HR: 64 (18 @ 16:41)  BP: 116/62 (18 @ 16:41)  RR: 18 (18 @ 16:41)  SpO2: 99% (18 @ 16:41)  Wt(kg): --     @ 07:01  -  11-15 @ 07:00  --------------------------------------------------------  IN: 0 mL / OUT: 250 mL / NET: -250 mL    11-15 @ 07:01  -   @ 07:00  --------------------------------------------------------  IN: 300 mL / OUT: 0 mL / NET: 300 mL     @ 07:01  -   @ 16:55  --------------------------------------------------------  IN: 75 mL / OUT: 0 mL / NET: 75 mL      Height (cm): 167.64 ( @ 12:34)  Weight (kg): 75 ( @ 12:34)  BMI (kg/m2): 26.7 ( 12:34)  BSA (m2): 1.84 ( 12:34)    LABS:      143  |  99  |  26<H>  ----------------------------<  114<H>  4.4   |  23  |  1.5    Ca    10.0      2018 05:10  Phos  2.7     11-15  Mg     2.3     11-15                            14.0   15.58 )-----------( 179      ( 2018 16:15 )             42.6       Urine Studies:  Urinalysis Basic - ( 2018 11:00 )    Color: Yellow / Appearance: Clear / S.020 / pH:   Gluc:  / Ketone: Negative  / Bili: Negative / Urobili: 0.2 mg/dL   Blood:  / Protein: 100 mg/dL / Nitrite: Negative   Leuk Esterase: Negative / RBC:  / WBC    Sq Epi:  / Non Sq Epi:  / Bacteria:           RADIOLOGY & ADDITIONAL STUDIES:

## 2018-11-16 NOTE — PROGRESS NOTE ADULT - ASSESSMENT
This is a 78 year old male with PMHx of HTN, Anxiety, Stage 4 lung carcinoma (Non small cell) on brigatinib for 67months who initially presented to [facility] site /2 mechanical fall. Patient was transferred to the [facility] after being found to have a right femoral head fracture.     #Right femoral head fracture 2/2 mechanical fall   - Orthopedics consulted; plan for OR today. Medical and cardiology attending notes with clearance    - Pain control: Morphine 4mg IV q4 PRN  - Zofran for nausea   - TSH elevated to 6.06     #Diffuse Osteopenia  - Outpatient DEXA    #Conjunctivitis  - Start on Trimethoprin and Polymyxin     #DIDI vs CKD  - No history of CKD.  - Continue on D5 1/2NS @75 cc/hour  - Urine studies ordered today    #Stage 4 Non Small Cell Lung Cancer  - Taking Brigatinib at home, on hold while in patient    #HTN  - On amlodpine 5mg daily and Metoprolol 25mg BID while inpatient  - Continue to monitor while inpatient.  - Home meds to1 be resumed on discharge    #Constipation  - Bowel regimen Colace, Senna     Activity: Bedrest  Diet: DASH  DVT ppx: Heparin SubQ  GI ppx: Protonix  Code Status: Full Code  Dispo: To OR today

## 2018-11-16 NOTE — PROGRESS NOTE ADULT - SUBJECTIVE AND OBJECTIVE BOX
Hospital Day:  2d    Subjective:  No overnight events. Seen and examined at bedside. Reports that he has severe pain when moving his right leg. Informed the patient that his pain will improve when he gets his surgery. The patient stated that he also has some nausea which is to be expected secondary to the morphine. No fevers, chills, vomiting, chest pain, constipation, diarrhea, shortness of breath, fatigue.     Past Medical Hx:   Lung cancer  Hypertension    Past Sx:  History of hemorrhoidectomy    Allergies:  No Known Allergies    Current Meds:   Standng Meds:  amLODIPine   Tablet 5 milliGRAM(s) Oral daily  dextrose 5% + sodium chloride 0.45%. 1000 milliLiter(s) (75 mL/Hr) IV Continuous <Continuous>  heparin  Injectable 5000 Unit(s) SubCutaneous every 8 hours  metoprolol tartrate 25 milliGRAM(s) Oral two times a day  ondansetron    Tablet 4 milliGRAM(s) Oral every 8 hours  pantoprazole    Tablet 40 milliGRAM(s) Oral before breakfast    PRN Meds:  morphine  - Injectable 4 milliGRAM(s) IV Push every 3 hours PRN Severe Pain (7 - 10)    Vital Signs:   T(F): 99.1 (11-16-18 @ 07:19), Max: 100.4 (11-15-18 @ 15:45)  HR: 74 (11-16-18 @ 07:19) (74 - 95)  BP: 158/71 (11-16-18 @ 07:19) (148/77 - 169/77)  RR: 18 (11-16-18 @ 07:19) (18 - 18)  SpO2: --      11-15-18 @ 07:01  -  11-16-18 @ 07:00  --------------------------------------------------------  IN: 300 mL / OUT: 0 mL / NET: 300 mL        Physical Exam:   GENERAL: NAD, Lying in bed, pleasant and conversive.   HEENT: NCAT, PERRLA, EOMI  CHEST/LUNG: CTA, No wheezing, rhonchi, rales  HEART: Regular rate and rhythm; s1 s2 appreciated, No murmurs, rubs, or gallops  ABDOMEN: Soft, Nontender, Nondistended; Bowel sounds present  EXTREMITIES: No LE edema b/l, Peripheral pulses 2+, No cyanosis, no clubbing, Muscle strength 3/5 in RLE, 5/5 in LLE and in bilateral UE   NERVOUS SYSTEM:  Alert & Oriented X3, Cranial nerves ii-xii grossly intact, sensation equal and intact      Labs:                         15.0   14.14 )-----------( 132      ( 16 Nov 2018 05:10 )             45.7     Neutophil% 89.6, Lymphocyte% 5.2, Monocyte% 4.4, Bands% 0.4 11-16-18 @ 05:10    16 Nov 2018 05:10    143    |  99     |  26     ----------------------------<  114    4.4     |  23     |  1.5      Ca    10.0       16 Nov 2018 05:10  Phos  2.7       15 Nov 2018 06:05  Mg     2.3       15 Nov 2018 06:05         pTT    30.6             ----< 1.03 INR  (11-16-18 @ 05:10)    11.80        PT    Radiology:   None Today     Assessment and Plan:   This is a 78 year old male with PMHx of HTN, Anxiety, Stage 4 lung carcinoma (Non small cell) on brigatinib for 67months who initially presented to [facility] /2 mechanical fall. Patient was transferred to the [facility] after being found to have a right femoral head fracture.     #Right femoral head fracture 2/2 mechanical fall   - Orthopedics consulted; plan for OR today. Medical and cardiology attending notes with clearance    - Pain control: Morphine 4mg IV q4 PRN  - Zofran for nausea   - TSH elevated to 6.06     #Diffuse Osteopenia  - Outpatient DEXA    #DIDI vs CKD  - No history of CKD.  - Continue on D5 1/2NS @75 cc/hour  - Urine studies ordered today    #Stage 4 Non Small Cell Lung Cancer  - Taking Brigatinib at home, on hold while in patient    #HTN  - On amlodpine 5mg daily and Metoprolol 25mg BID while inpatient  - Continue to monitor while inpatient.  - Home meds to1 be resumed on discharge    Activity: Bedrest  Diet: DASH  DVT ppx: Heparin SubQ  GI ppx: Protonix  Code Status: Full Code  Dispo: To OR today Hospital Day:  2d    Subjective:  No overnight events. Seen and examined at bedside. Reports that he has severe pain when moving his right leg. Informed the patient that his pain will improve when he gets his surgery. The patient stated that he also has some nausea which is to be expected secondary to the morphine. No fevers, chills, vomiting, chest pain, constipation, diarrhea, shortness of breath, fatigue.     Past Medical Hx:   Lung cancer  Hypertension    Past Sx:  History of hemorrhoidectomy    Allergies:  No Known Allergies    Current Meds:   Standng Meds:  amLODIPine   Tablet 5 milliGRAM(s) Oral daily  dextrose 5% + sodium chloride 0.45%. 1000 milliLiter(s) (75 mL/Hr) IV Continuous <Continuous>  heparin  Injectable 5000 Unit(s) SubCutaneous every 8 hours  metoprolol tartrate 25 milliGRAM(s) Oral two times a day  ondansetron    Tablet 4 milliGRAM(s) Oral every 8 hours  pantoprazole    Tablet 40 milliGRAM(s) Oral before breakfast    PRN Meds:  morphine  - Injectable 4 milliGRAM(s) IV Push every 3 hours PRN Severe Pain (7 - 10)    Vital Signs:   T(F): 99.1 (11-16-18 @ 07:19), Max: 100.4 (11-15-18 @ 15:45)  HR: 74 (11-16-18 @ 07:19) (74 - 95)  BP: 158/71 (11-16-18 @ 07:19) (148/77 - 169/77)  RR: 18 (11-16-18 @ 07:19) (18 - 18)  SpO2: --      11-15-18 @ 07:01  -  11-16-18 @ 07:00  --------------------------------------------------------  IN: 300 mL / OUT: 0 mL / NET: 300 mL        Physical Exam:   GENERAL: NAD, Lying in bed, pleasant and conversive.   HEENT: NCAT, PERRLA, EOMI, Bilateral green discharge from eyes  CHEST/LUNG: CTA, No wheezing, rhonchi, rales  HEART: Regular rate and rhythm; s1 s2 appreciated, No murmurs, rubs, or gallops  ABDOMEN: Soft, mildly tender, Nondistended; Bowel sounds present  EXTREMITIES: No LE edema b/l, Peripheral pulses 2+, No cyanosis, no clubbing, Muscle strength 3/5 in RLE, 5/5 in LLE and in bilateral UE   NERVOUS SYSTEM:  Alert & Oriented X3, Cranial nerves ii-xii grossly intact, sensation equal and intact      Labs:                         15.0   14.14 )-----------( 132      ( 16 Nov 2018 05:10 )             45.7     Neutophil% 89.6, Lymphocyte% 5.2, Monocyte% 4.4, Bands% 0.4 11-16-18 @ 05:10    16 Nov 2018 05:10    143    |  99     |  26     ----------------------------<  114    4.4     |  23     |  1.5      Ca    10.0       16 Nov 2018 05:10  Phos  2.7       15 Nov 2018 06:05  Mg     2.3       15 Nov 2018 06:05         pTT    30.6             ----< 1.03 INR  (11-16-18 @ 05:10)    11.80        PT    Radiology:   None Today     Assessment and Plan:   This is a 78 year old male with PMHx of HTN, Anxiety, Stage 4 lung carcinoma (Non small cell) on brigatinib for 67months who initially presented to [facility] /2 mechanical fall. Patient was transferred to the [facility] after being found to have a right femoral head fracture.     #Right femoral head fracture 2/2 mechanical fall   - Orthopedics consulted; plan for OR today. Medical and cardiology attending notes with clearance    - Pain control: Morphine 4mg IV q4 PRN  - Zofran for nausea   - TSH elevated to 6.06     #Diffuse Osteopenia  - Outpatient DEXA    #Conjunctivitis  - Start on Trimethoprin and Polymyxin     #DIDI vs CKD  - No history of CKD.  - Continue on D5 1/2NS @75 cc/hour  - Urine studies ordered today    #Stage 4 Non Small Cell Lung Cancer  - Taking Brigatinib at home, on hold while in patient    #HTN  - On amlodpine 5mg daily and Metoprolol 25mg BID while inpatient  - Continue to monitor while inpatient.  - Home meds to1 be resumed on discharge    #Constipation  - Bowel regimen Colace, Senna     Activity: Bedrest  Diet: DASH  DVT ppx: Heparin SubQ  GI ppx: Protonix  Code Status: Full Code  Dispo: To OR today

## 2018-11-16 NOTE — BRIEF OPERATIVE NOTE - POST-OP DX
Closed fracture of neck of right femur, initial encounter  11/16/2018    Active  Hip-Trever Vallecillo

## 2018-11-16 NOTE — CONSULT NOTE ADULT - SUBJECTIVE AND OBJECTIVE BOX
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 Fall not preceded by chest pain or dizziness, was unable to get up and ambulate so called 911 and was taken to [facility] site. He was transferred to [place] today. Severe pain right hip region with any movement . X-ray revealed minimally displaced sub capital fracture right femoral head.    Denies allergies on current chemo agent.  He is currently under the care of Dr. Goins for cardiology    right hip:  Tender to palpation, no ecchymosis or swelling, limited ROM secondary to pain, no gross swelling of lower extremity, dorsi and plantar flexion intact, 2+ DP, sensory intact right foot.    Assessment:   Minimally displaced sub capital fracture right femoral head.    Plan:   operative fixation tentatively for Friday providing medical clearance by internal medicine and cardiology.  Needs right femur X-rays.
NEPHROLOGY CONSULTATION NOTE    78 years old male pt with past medical hx of HTN for 3 years, anxiety, stage 4 lung carcinoma (non small cell carcinoma) on brigatinib for 6 months transferred from [facility] s/p mechanical fall yesterday.  Patient was in usual state till yesterday morning, when he tripped while turning around in his kitchen, he denies any preceding symptoms like chest pain, palpitation, dizziness, visual symptoms. After fall, he started having pain his right hip according to him 10/10 sharp pain, non radiating, was unable to move his right leg afterward, was not able to stand or walk later.  They called ambulance at 11pm, went to [facility], transferred to [place] at 4 am  In [facility] ER, xray pelvis showed fracture of right  femoral head.  he has on /off shortness of breath with minimal exertion for last 1 years, he saw his primary and was told because of fluid in lungs, is able to walk, needs help for his daily activities.  he denies any weight changes, no cough, is feeling weak and lethargic lately, also complaints of tingling sensation in his feet bilaterally after starting chemotherapy.  he denies any fever, no nausea, no vomiting, no urinary complaints    PAST MEDICAL & SURGICAL HISTORY:  Lung cancer  Hypertension  History of hemorrhoidectomy    Allergies:  No Known Allergies    Home Medications Reviewed    SOCIAL HISTORY:  Denies ETOH,Smoking,   FAMILY HISTORY:  No pertinent family history in first degree relatives        REVIEW OF SYSTEMS:  All other review of systems is negative unless indicated above.    PHYSICAL EXAM:  NAD  awake and alert  moist mm  no jvd  cat bl  rrr  soft, nt  right leg shortened and rotated  no cvat  no rash    Hospital Medications:   MEDICATIONS  (STANDING):  amLODIPine   Tablet 5 milliGRAM(s) Oral daily  dextrose 5% + sodium chloride 0.45%. 1000 milliLiter(s) (75 mL/Hr) IV Continuous <Continuous>  heparin  Injectable 5000 Unit(s) SubCutaneous every 8 hours  metoprolol tartrate 25 milliGRAM(s) Oral two times a day  ondansetron    Tablet 4 milliGRAM(s) Oral every 8 hours  pantoprazole    Tablet 40 milliGRAM(s) Oral before breakfast        VITALS:  T(F): 100.4 (11-15-18 @ 15:45), Max: 100.4 (11-15-18 @ 15:45)  HR: 95 (11-15-18 @ 17:21)  BP: 169/77 (11-15-18 @ 17:21)  RR: 18 (11-15-18 @ 17:21)  SpO2: --  Wt(kg): --    11-14 @ 07:01  -  11-15 @ 07:00  --------------------------------------------------------  IN: 0 mL / OUT: 250 mL / NET: -250 mL    11-15 @ 07:01  -  11-15 @ 19:29  --------------------------------------------------------  IN: 300 mL / OUT: 0 mL / NET: 300 mL      Height (cm): 167.64 (11-15 @ 17:21)  Weight (kg): 75.3 (11-15 @ 17:21)  BMI (kg/m2): 26.8 (11-15 @ 17:21)  BSA (m2): 1.85 (11-15 @ 17:21)    LABS:  11-15    144  |  103  |  23<H>  ----------------------------<  106<H>  5.1<H>   |  26  |  1.5    Ca    9.8      15 Nov 2018 06:05  Phos  2.7     11-15  Mg     2.3     11-15    TPro  6.6  /  Alb  4.1  /  TBili  0.2  /  DBili      /  AST  36  /  ALT  35  /  AlkPhos  109  11-14                          13.9   14.09 )-----------( 128      ( 15 Nov 2018 06:05 )             40.9       Urine Studies:        RADIOLOGY & ADDITIONAL STUDIES:
Patient is a 78y old  Male who presents with a chief complaint of s/p mechanical fall (15 Nov 2018 09:22)        PAST MEDICAL & SURGICAL HISTORY:  Lung cancer  Hypertension  History of hemorrhoidectomy      HPI: pt with h/o as above grade 2 diastolic dysfunction, h/o b/l pleural effusion in past with mechanical fall and hip fracture.                 PREVIOUS DIAGNOSTIC TESTING:      ECHO  FINDINGS:    STRESS  FINDINGS:    CATHETERIZATION  FINDINGS:    ELECTROPHYSIOLOGY STUDY  FINDINGS:    CAROTID ULTRASOUND:  FINDINGS    VENOUS DUPLEX SCAN:  FINDINGS:    CHEST CT PULMONARY ANGIO with IV Contrast:  FINDINGS:  MEDICATIONS  (STANDING):  amLODIPine   Tablet 5 milliGRAM(s) Oral daily  dextrose 5% + sodium chloride 0.45%. 1000 milliLiter(s) (75 mL/Hr) IV Continuous <Continuous>  heparin  Injectable 5000 Unit(s) SubCutaneous every 8 hours  metoprolol tartrate 25 milliGRAM(s) Oral two times a day  ondansetron    Tablet 4 milliGRAM(s) Oral every 8 hours  pantoprazole    Tablet 40 milliGRAM(s) Oral before breakfast    MEDICATIONS  (PRN):  morphine  - Injectable 4 milliGRAM(s) IV Push every 3 hours PRN Severe Pain (7 - 10)      FAMILY HISTORY:  No pertinent family history in first degree relatives      SOCIAL HISTORY:    CIGARETTES:    ALCOHOL:    REVIEW OF SYSTEMS:      RESPIRATORY: SEE HPI   CARDIOVASCULAR: SEE HPI   GASTROINTESTINAL: No abdominal pain  NEUROLOGICAL: grossly intact motor and sensory   ENDOCRINE: No heat or cold intolerance, or hair loss  MUSCULOSKELETAL: No joint pain or swelling, muscle, back, or extremity pain  HEME/LYMPH: No easy bruising or bleeding gums      Vital Signs Last 24 Hrs  T(C): 36.9 (15 Nov 2018 07:15), Max: 37.3 (14 Nov 2018 16:21)  T(F): 98.5 (15 Nov 2018 07:15), Max: 99.2 (14 Nov 2018 16:21)  HR: 95 (15 Nov 2018 07:15) (93 - 104)  BP: 174/85 (15 Nov 2018 07:15) (128/72 - 177/82)  BP(mean): --  RR: 18 (15 Nov 2018 07:15) (18 - 18)  SpO2: 99% (14 Nov 2018 16:21) (99% - 99%)    PHYSICAL EXAM:    GENERAL: In no apparent distress, well nourished, and hydrated.  HEAD:  Atraumatic, Normocephalic  EYES: EOMI, PERRLA, conjunctiva and sclera clear  ENMT: No tonsillar erythema, exudates, or enlargement; Moist mucous membranes, Good dentition, No lesions  NECK: Supple and normal thyroid.  No JVD or carotid bruit.  Carotid pulse is 2+ bilaterally.  HEART: Regular rate and rhythm; No murmurs, rubs, or gallops.  PULMONARY: Clear to auscultation and perfusion.  No rales, wheezing, or rhonchi bilaterally.  ABDOMEN: Soft, Nontender, Nondistended; Bowel sounds present  EXTREMITIES:  2+ Peripheral Pulses, No clubbing, cyanosis, or edema  LYMPH: No lymphadenopathy noted  NEUROLOGICAL: Grossly nonfocal          INTERPRETATION OF TELEMETRY:    ECG:    I&O's Detail    14 Nov 2018 07:01  -  15 Nov 2018 07:00  --------------------------------------------------------  IN:  Total IN: 0 mL    OUT:    Voided: 250 mL  Total OUT: 250 mL    Total NET: -250 mL          LABS:                        13.9   14.09 )-----------( 128      ( 15 Nov 2018 06:05 )             40.9     11-15    144  |  103  |  23<H>  ----------------------------<  106<H>  5.1<H>   |  26  |  1.5    Ca    9.8      15 Nov 2018 06:05  Phos  2.7     11-15  Mg     2.3     11-15    TPro  6.6  /  Alb  4.1  /  TBili  0.2  /  DBili  x   /  AST  36  /  ALT  35  /  AlkPhos  109  11-14        PT/INR - ( 14 Nov 2018 02:30 )   PT: 11.70 sec;   INR: 1.08 ratio         PTT - ( 14 Nov 2018 02:30 )  PTT:25.1 sec    BNP  I&O's Detail    14 Nov 2018 07:01  -  15 Nov 2018 07:00  --------------------------------------------------------  IN:  Total IN: 0 mL    OUT:    Voided: 250 mL  Total OUT: 250 mL    Total NET: -250 mL        Daily Height in cm: 167.64 (14 Nov 2018 22:25)    Daily     RADIOLOGY & ADDITIONAL STUDIES:
Patient is a 78y old  Male who presents with a chief complaint of s/p mechanical fall (2018 16:54)    HPI:  78 years old male pt with past medical hx of HTN for 3 years, anxiety, stage 4 lung carcinoma (non small cell carcinoma) on brigatinib for 6 months transferred from [facility] s/p mechanical fall yesterday.  Patient was in usual state till yesterday morning, when he tripped while turning around in his kitchen, he denies any preceding symptoms like chest pain, palpitation, dizziness, visual symptoms. After fall, he started having pain his right hip according to him 10/10 sharp pain, non radiating, was unable to move his right leg afterward, was not able to stand or walk later.  They called ambulance at 11pm, went to [facility], transferred to [place] at 4 am  In [facility] ER, xray pelvis showed fracture of right  femoral head.  he has on /off shortness of breath with minimal exertion for last 1 years, he saw his primary and was told because of fluid in lungs, is able to walk, needs help for his daily activities.  he denies any weight changes, no cough, is feeling weak and lethargic lately, also complaints of tingling sensation in his feet bilaterally after starting chemotherapy.  he denies any fever, no nausea, no vomiting, no urinary complaints (2018 13:32)      PAST MEDICAL & SURGICAL HISTORY:  Lung cancer  Hypertension  History of hemorrhoidectomy      Hospital Course: Seen by Ortho SP R hemiarthroplasty today- For TTWB RLE- SP Spinal anesthesia    TODAY'S SUBJECTIVE & REVIEW OF SYMPTOMS:     Constitutional WNL   Cardio WNL   Resp WNL   GI WNL  Heme WNL  Endo WNL  Skin WNL  MSK pain RLE  Neuro WNL  Cognitive WNL  Psych WNL      MEDICATIONS  (STANDING):  amLODIPine   Tablet 5 milliGRAM(s) Oral daily  ceFAZolin   IVPB 2000 milliGRAM(s) IV Intermittent every 8 hours  docusate sodium 100 milliGRAM(s) Oral two times a day  heparin  Injectable 5000 Unit(s) SubCutaneous every 8 hours  lactated ringers. 1000 milliLiter(s) (75 mL/Hr) IV Continuous <Continuous>  lactated ringers. 1000 milliLiter(s) (100 mL/Hr) IV Continuous <Continuous>  metoprolol tartrate 25 milliGRAM(s) Oral two times a day  ondansetron    Tablet 4 milliGRAM(s) Oral every 8 hours  pantoprazole    Tablet 40 milliGRAM(s) Oral before breakfast  polyethylene glycol 3350 17 Gram(s) Oral two times a day  senna 2 Tablet(s) Oral at bedtime  trimethoprim/polymyxin Solution 1 Drop(s) Both EYES every 3 hours    MEDICATIONS  (PRN):  HYDROmorphone  Injectable 0.5 milliGRAM(s) IV Push every 10 minutes PRN Moderate Pain (4 - 6)  morphine  - Injectable 4 milliGRAM(s) IV Push every 3 hours PRN Severe Pain (7 - 10)  morphine  - Injectable 4 milliGRAM(s) IV Push every 10 minutes PRN Severe Pain (7 - 10)  ondansetron Injectable 4 milliGRAM(s) IV Push once PRN Nausea and/or Vomiting  oxyCODONE    IR 5 milliGRAM(s) Oral once PRN Moderate Pain (4 - 6)  oxyCODONE    IR 10 milliGRAM(s) Oral every 4 hours PRN Moderate Pain (4 - 6)  oxyCODONE    IR 5 milliGRAM(s) Oral every 4 hours PRN Mild Pain (1 - 3)      FAMILY HISTORY:  No pertinent family history in first degree relatives      Allergies    No Known Allergies    Intolerances        SOCIAL HISTORY:    [    ] Etoh  [    ] Smoking  [    ] Substance abuse     Home Environment:  [    ] Home Alone  [  x  ] Lives with Family  [    ] Home Health Aid    Dwelling:  [    ] Apartment  [  x  ] Private House  [    ] Adult Home  [    ] Skilled Nursing Facility      [    ] Short Term  [    ] Long Term  [   x ] Stairs  outside                         [    ] Elevator     FUNCTIONAL STATUS PTA: (Check all that apply)  Ambulation: [    x ]Independent    [    ] Dependent     [    ] Non-Ambulatory  Assistive Device: [   x ] SA Cane  [    ]  Q Cane  [    ] Walker  [    ]  Wheelchair  ADL : [   x] Independent  [    ]  Dependent   OCC USES CANE    Vital Signs Last 24 Hrs  T(C): 36.4 (2018 16:41), Max: 38 (15 Nov 2018 17:21)  T(F): 97.5 (2018 16:41), Max: 99.7 (2018 00:00)  HR: 64 (2018 16:41) (64 - 95)  BP: 116/62 (2018 16:41) (93/70 - 169/77)  BP(mean): --  RR: 18 (2018 16:41) (11 - 18)  SpO2: 99% (2018 16:41) (96% - 99%)      PHYSICAL EXAM: Alert & Oriented X3 on O2 IN RR  GENERAL: NAD, well-groomed, well-developed  HEAD:  Atraumatic, Normocephalic  EYES: EOMI, PERRLA, conjunctiva and sclera clear  NECK: Supple, No JVD, Normal thyroid  CHEST/LUNG: Clear bilaterally; No rales, rhonchi, wheezing, or rubs  HEART: Regular rate and rhythm; No murmurs, rubs, or gallops  ABDOMEN: Soft, Nontender, Nondistended; Bowel sounds present  EXTREMITIES:  R hip bandaged no calf tenderness    NERVOUS SYSTEM:  Cranial Nerves 2-12 intact [ x   ] Abnormal  [    ]  ROM: WFL all extremities [ x   ]  Abnormal [     ]  Motor Strength: WFL all extremities  [    ]  Abnormal [ x   ]2nd spinal anesthesia- flexes hips, distally without movement  Sensation: intact to light touch [    ] Abnormal [  x  ]diminished distally secondary to spinal      FUNCTIONAL STATUS:  Bed Mobility: [   ]  Independent [    ]  Supervision [   x ]  Needs Assistance [  ]  N/A  Transfers: [    ]  Independent [    ]  Supervision [    ]  Needs Assistance [ x   ]  N/A    Ambulation:  [    ]  Independent [    ]  Supervision [    ]  Needs Assistance [ x   ]  N/A   ADL:  [    ]   Independent [  x ] Requires Assistance [    ] N/A       LABS:                        14.0   15.58 )-----------( 179      ( 2018 16:15 )             42.6     11-16    143  |  99  |  26<H>  ----------------------------<  114<H>  4.4   |  23  |  1.5    Ca    10.0      2018 05:10  Phos  2.7     11-15  Mg     2.3     11-15      PT/INR - ( 2018 05:10 )   PT: 11.80 sec;   INR: 1.03 ratio         PTT - ( 2018 05:10 )  PTT:30.6 sec  Urinalysis Basic - ( 2018 11:00 )    Color: Yellow / Appearance: Clear / S.020 / pH: x  Gluc: x / Ketone: Negative  / Bili: Negative / Urobili: 0.2 mg/dL   Blood: x / Protein: 100 mg/dL / Nitrite: Negative   Leuk Esterase: Negative / RBC: x / WBC x   Sq Epi: x / Non Sq Epi: x / Bacteria: x        RADIOLOGY & ADDITIONAL STUDIES:

## 2018-11-16 NOTE — CONSULT NOTE ADULT - ASSESSMENT
IMPRESSION: Rehab of R hip fx fem neck SP hemiarthroplasty    PRECAUTIONS: [   x ] Cardiac  [ x   ] Respiratory  [    ] Seizures [    ] Contact Isolation  [    ] Droplet Isolation  [    ] Other    Weight Bearing Status: TTWB RLE    RECOMMENDATION:    Out of Bed to Chair     DVT/Decubiti Prophylaxis    REHAB PLAN:     [    x ] Bedside P/T 3-5 times a week   [  x   ] Bedside O/T  2-3 times a week   [     ] No Rehab Therapy Indicated   [     ]  Speech Therapy   Conditioning/ROM                                 ADL  Bed Mobility                                            Conditioning/ROM  Transfers                                                  Bed Mobility  Sitting /Standing Balance                      Transfers                                        Gait Training                                            Sitting/Standing Balance  Stair Training [   ]Applicable                 Home equipment Eval                                                                     Splinting  [   ] Only      GOALS:   ADL   [  x  ]   Independent         Transfers  [ x   ] Independent            Ambulation  [    x ] Independent     [   x  ] With device                            [    ]  CG                                               [    ]  CG                                                    [     ] CG                            [    ] Min A                                          [    ] Min A                                                [     ] Min  A          DISCHARGE PLAN:   [   x ]  Good candidate for Intensive Rehabilitation/Hospital based                                             Will tolerate 3hrs Intensive Rehab Daily                                       [      ]  Short Term Rehab in Skilled Nursing Facility                                       [      ]  Home with Outpatient or  services                                         [      ]  Possible Candidate for Intensive Hospital based Rehab

## 2018-11-16 NOTE — BRIEF OPERATIVE NOTE - PRE-OP DX
Closed fracture of neck of right femur, initial encounter  11/16/2018    Active  Hip-Trever Valelcillo

## 2018-11-16 NOTE — CHART NOTE - NSCHARTNOTEFT_GEN_A_CORE
PACU ANESTHESIA ADMISSION NOTE      Procedure:   Post op diagnosis:      ____  Intubated  TV:______       Rate: ______      FiO2: ______    _x___  Patent Airway    __x__  Full return of protective reflexes    __x__  Full recovery from anesthesia / back to baseline status    Vitals:  T(C): 98.4  HR: 67  BP: 111/64  RR: 18  SpO2: 100%    Mental Status:  __x__ Awake   _____ Alert   _____ Drowsy   _____ Sedated    Nausea/Vomiting:  ___x_ NO  ______Yes,   See Post - Op Orders          Pain Scale (0-10):  __0___    Treatment: ____ None    ____ See Post - Op/PCA Orders    Post - Operative Fluids:   ___x_ Oral   ____ See Post - Op Orders    Plan: Discharge:   ____Home       ___x__Floor     _____Critical Care    _____  Other:_________________    Comments: uneventful anesthesia course no complications. VItals stable. Pt transferred to PACU

## 2018-11-16 NOTE — PROGRESS NOTE ADULT - SUBJECTIVE AND OBJECTIVE BOX
Patient was seen and examined. Spoke with RN. Chart reviewed.    No events overnight.  Vital Signs Last 24 Hrs  T(F): 97.6 (2018 17:41), Max: 99.7 (2018 00:00)  HR: 66 (2018 17:41) (64 - 80)  BP: 106/60 (2018 17:41) (93/70 - 158/71)  SpO2: 98% (2018 17:41) (96% - 99%)  MEDICATIONS  (STANDING):  amLODIPine   Tablet 5 milliGRAM(s) Oral daily  ceFAZolin   IVPB 2000 milliGRAM(s) IV Intermittent every 8 hours  docusate sodium 100 milliGRAM(s) Oral two times a day  heparin  Injectable 5000 Unit(s) SubCutaneous every 8 hours  lactated ringers. 1000 milliLiter(s) (75 mL/Hr) IV Continuous <Continuous>  lactated ringers. 1000 milliLiter(s) (100 mL/Hr) IV Continuous <Continuous>  metoprolol tartrate 25 milliGRAM(s) Oral two times a day  ondansetron    Tablet 4 milliGRAM(s) Oral every 8 hours  pantoprazole    Tablet 40 milliGRAM(s) Oral before breakfast  polyethylene glycol 3350 17 Gram(s) Oral two times a day  senna 2 Tablet(s) Oral at bedtime  trimethoprim/polymyxin Solution 1 Drop(s) Both EYES every 3 hours    MEDICATIONS  (PRN):  HYDROmorphone  Injectable 0.5 milliGRAM(s) IV Push every 10 minutes PRN Moderate Pain (4 - 6)  morphine  - Injectable 4 milliGRAM(s) IV Push every 3 hours PRN Severe Pain (7 - 10)  morphine  - Injectable 4 milliGRAM(s) IV Push every 10 minutes PRN Severe Pain (7 - 10)  ondansetron Injectable 4 milliGRAM(s) IV Push once PRN Nausea and/or Vomiting  oxyCODONE    IR 5 milliGRAM(s) Oral once PRN Moderate Pain (4 - 6)  oxyCODONE    IR 10 milliGRAM(s) Oral every 4 hours PRN Moderate Pain (4 - 6)  oxyCODONE    IR 5 milliGRAM(s) Oral every 4 hours PRN Mild Pain (1 - 3)    Labs:                        14.0   15.58 )-----------( 179      ( 2018 16:15 )             42.6                         15.0   14.14 )-----------( 132      ( 2018 05:10 )             45.7     2018 16:15    141    |  102    |  31     ----------------------------<  114    4.0     |  22     |  1.6    2018 05:10    143    |  99     |  26     ----------------------------<  114    4.4     |  23     |  1.5      Ca    9.2        2018 16:15  Ca    10.0       2018 05:10  Phos  2.7       15 Nov 2018 06:05  Mg     2.3       15 Nov 2018 06:05      PT/INR - ( 2018 05:10 )   PT: 11.80 sec;   INR: 1.03 ratio         PTT - ( 2018 05:10 )  PTT:30.6 sec  Urinalysis Basic - ( 2018 11:00 )    Color: Yellow / Appearance: Clear / S.020 / pH: x  Gluc: x / Ketone: Negative  / Bili: Negative / Urobili: 0.2 mg/dL   Blood: x / Protein: 100 mg/dL / Nitrite: Negative   Leuk Esterase: Negative / RBC: x / WBC x   Sq Epi: x / Non Sq Epi: x / Bacteria: x          Radiology:    General: comfortable, NAD  Neurology: A&Ox3, nonfocal  Head:  Normocephalic, atraumatic  ENT:  Mucosa moist, no ulcerations  Neck:  Supple, no JVD,   Resp: CTA B/L  CV: RRR, S1S2,   GI: Soft, NT, bowel sounds  MS: No edema, + peripheral pulses, FROM all 4 extremity

## 2018-11-17 LAB
ANION GAP SERPL CALC-SCNC: 17 MMOL/L — HIGH (ref 7–14)
BASOPHILS # BLD AUTO: 0.02 K/UL — SIGNIFICANT CHANGE UP (ref 0–0.2)
BASOPHILS NFR BLD AUTO: 0.2 % — SIGNIFICANT CHANGE UP (ref 0–1)
BUN SERPL-MCNC: 32 MG/DL — HIGH (ref 10–20)
CALCIUM SERPL-MCNC: 8.6 MG/DL — SIGNIFICANT CHANGE UP (ref 8.5–10.1)
CHLORIDE SERPL-SCNC: 98 MMOL/L — SIGNIFICANT CHANGE UP (ref 98–110)
CO2 SERPL-SCNC: 24 MMOL/L — SIGNIFICANT CHANGE UP (ref 17–32)
CREAT ?TM UR-MCNC: 117 MG/DL — SIGNIFICANT CHANGE UP
CREAT SERPL-MCNC: 1.6 MG/DL — HIGH (ref 0.7–1.5)
EOSINOPHIL # BLD AUTO: 0.11 K/UL — SIGNIFICANT CHANGE UP (ref 0–0.7)
EOSINOPHIL NFR BLD AUTO: 0.9 % — SIGNIFICANT CHANGE UP (ref 0–8)
GLUCOSE SERPL-MCNC: 87 MG/DL — SIGNIFICANT CHANGE UP (ref 70–99)
HCT VFR BLD CALC: 37.8 % — LOW (ref 42–52)
HGB BLD-MCNC: 12.7 G/DL — LOW (ref 14–18)
IMM GRANULOCYTES NFR BLD AUTO: 0.6 % — HIGH (ref 0.1–0.3)
LYMPHOCYTES # BLD AUTO: 1.05 K/UL — LOW (ref 1.2–3.4)
LYMPHOCYTES # BLD AUTO: 9 % — LOW (ref 20.5–51.1)
MCHC RBC-ENTMCNC: 28 PG — SIGNIFICANT CHANGE UP (ref 27–31)
MCHC RBC-ENTMCNC: 33.6 G/DL — SIGNIFICANT CHANGE UP (ref 32–37)
MCV RBC AUTO: 83.4 FL — SIGNIFICANT CHANGE UP (ref 80–94)
MONOCYTES # BLD AUTO: 1.15 K/UL — HIGH (ref 0.1–0.6)
MONOCYTES NFR BLD AUTO: 9.8 % — HIGH (ref 1.7–9.3)
NEUTROPHILS # BLD AUTO: 9.29 K/UL — HIGH (ref 1.4–6.5)
NEUTROPHILS NFR BLD AUTO: 79.5 % — HIGH (ref 42.2–75.2)
NRBC # BLD: 0 /100 WBCS — SIGNIFICANT CHANGE UP (ref 0–0)
PLATELET # BLD AUTO: 167 K/UL — SIGNIFICANT CHANGE UP (ref 130–400)
POTASSIUM SERPL-MCNC: 4.4 MMOL/L — SIGNIFICANT CHANGE UP (ref 3.5–5)
POTASSIUM SERPL-SCNC: 4.4 MMOL/L — SIGNIFICANT CHANGE UP (ref 3.5–5)
PROT ?TM UR-MCNC: 81 MG/DLG/24H — SIGNIFICANT CHANGE UP
PROT/CREAT UR-RTO: 0.7 RATIO — HIGH (ref 0–0.2)
RBC # BLD: 4.53 M/UL — LOW (ref 4.7–6.1)
RBC # FLD: 16 % — HIGH (ref 11.5–14.5)
SODIUM SERPL-SCNC: 139 MMOL/L — SIGNIFICANT CHANGE UP (ref 135–146)
WBC # BLD: 11.69 K/UL — HIGH (ref 4.8–10.8)
WBC # FLD AUTO: 11.69 K/UL — HIGH (ref 4.8–10.8)

## 2018-11-17 RX ADMIN — Medication 1 DROP(S): at 09:05

## 2018-11-17 RX ADMIN — PANTOPRAZOLE SODIUM 40 MILLIGRAM(S): 20 TABLET, DELAYED RELEASE ORAL at 08:07

## 2018-11-17 RX ADMIN — SENNA PLUS 2 TABLET(S): 8.6 TABLET ORAL at 22:16

## 2018-11-17 RX ADMIN — POLYETHYLENE GLYCOL 3350 17 GRAM(S): 17 POWDER, FOR SOLUTION ORAL at 05:09

## 2018-11-17 RX ADMIN — Medication 1 DROP(S): at 17:37

## 2018-11-17 RX ADMIN — MORPHINE SULFATE 4 MILLIGRAM(S): 50 CAPSULE, EXTENDED RELEASE ORAL at 05:06

## 2018-11-17 RX ADMIN — OXYCODONE HYDROCHLORIDE 10 MILLIGRAM(S): 5 TABLET ORAL at 11:34

## 2018-11-17 RX ADMIN — POLYETHYLENE GLYCOL 3350 17 GRAM(S): 17 POWDER, FOR SOLUTION ORAL at 17:40

## 2018-11-17 RX ADMIN — Medication 100 MILLIGRAM(S): at 17:49

## 2018-11-17 RX ADMIN — OXYCODONE HYDROCHLORIDE 5 MILLIGRAM(S): 5 TABLET ORAL at 17:39

## 2018-11-17 RX ADMIN — Medication 100 MILLIGRAM(S): at 05:06

## 2018-11-17 RX ADMIN — Medication 25 MILLIGRAM(S): at 17:49

## 2018-11-17 RX ADMIN — HEPARIN SODIUM 5000 UNIT(S): 5000 INJECTION INTRAVENOUS; SUBCUTANEOUS at 14:27

## 2018-11-17 RX ADMIN — OXYCODONE HYDROCHLORIDE 10 MILLIGRAM(S): 5 TABLET ORAL at 11:11

## 2018-11-17 RX ADMIN — Medication 1 DROP(S): at 22:01

## 2018-11-17 RX ADMIN — Medication 1 DROP(S): at 01:21

## 2018-11-17 RX ADMIN — Medication 1 DROP(S): at 11:08

## 2018-11-17 RX ADMIN — AMLODIPINE BESYLATE 5 MILLIGRAM(S): 2.5 TABLET ORAL at 05:08

## 2018-11-17 RX ADMIN — HEPARIN SODIUM 5000 UNIT(S): 5000 INJECTION INTRAVENOUS; SUBCUTANEOUS at 05:09

## 2018-11-17 RX ADMIN — Medication 100 MILLIGRAM(S): at 05:08

## 2018-11-17 RX ADMIN — Medication 25 MILLIGRAM(S): at 05:08

## 2018-11-17 RX ADMIN — OXYCODONE HYDROCHLORIDE 10 MILLIGRAM(S): 5 TABLET ORAL at 06:51

## 2018-11-17 RX ADMIN — OXYCODONE HYDROCHLORIDE 10 MILLIGRAM(S): 5 TABLET ORAL at 17:35

## 2018-11-17 RX ADMIN — HEPARIN SODIUM 5000 UNIT(S): 5000 INJECTION INTRAVENOUS; SUBCUTANEOUS at 22:17

## 2018-11-17 RX ADMIN — Medication 1 DROP(S): at 14:29

## 2018-11-17 RX ADMIN — Medication 1 DROP(S): at 05:22

## 2018-11-17 NOTE — PROGRESS NOTE ADULT - ASSESSMENT
Assessment and Plan:   This is a 78 year old male with PMHx of HTN, Anxiety, Stage 4 lung carcinoma (Non small cell) on brigatinib for 67months who initially presented to [facility] site /2 mechanical fall. Patient was transferred to the [facility] after being found to have a right femoral head fracture.     #Right femoral head fracture 2/2 mechanical fall   - Orthopedics consulted; POD #1  - Pain control: Morphine 4mg IV q4 PRN  - Zofran for nausea   - TSH elevated to 6.06     #Diffuse Osteopenia  - Outpatient DEXA    #Conjunctivitis  - Continue on Trimethoprin and Polymyxin     #DIDI vs CKD  - No history of CKD.  - Continue on D5 1/2NS @75 cc/hour  - Urine studies ordered today    #Stage 4 Non Small Cell Lung Cancer  - Taking Brigatinib at home, on hold while in patient  - Patient family concerned and has brought a few pills to the nurse. When patient can resume will need the med confirmed by the pharmacy.     #HTN  - On amlodpine 5mg daily and Metoprolol 25mg BID while inpatient  - Continue to monitor while inpatient.  - Home meds to be resumed on discharge    #Constipation  - Bowel regimen Colace, Senna     Activity: Bedrest

## 2018-11-17 NOTE — PROGRESS NOTE ADULT - SUBJECTIVE AND OBJECTIVE BOX
POC note  seen and examined  pain controlled  no cp sob  abdomen feels better  hip pain improved                          14.0   15.58 )-----------( 179      ( 16 Nov 2018 16:15 )             42.6     Vital Signs Last 24 Hrs  T(C): 36.9 (16 Nov 2018 23:00), Max: 37.6 (16 Nov 2018 06:24)  T(F): 98.4 (16 Nov 2018 23:00), Max: 99.7 (16 Nov 2018 06:24)  HR: 74 (16 Nov 2018 23:00) (64 - 80)  BP: 134/68 (16 Nov 2018 23:00) (93/70 - 160/77)  BP(mean): --  RR: 18 (16 Nov 2018 23:00) (11 - 22)  SpO2: 94% (16 Nov 2018 19:57) (94% - 99%)    PE  dressing cdi  ehl fhl intact  silt  wwp  comps soft    A/P  POD1 R anabel  TTWB  pain control  dvt ppx  ancef postop  med mgmt

## 2018-11-17 NOTE — PROGRESS NOTE ADULT - SUBJECTIVE AND OBJECTIVE BOX
Hospital Day:  3d    Subjective:  Yesterday the patient had his ORIF and is POD#1. He reported that he has some pain in his right leg     Past Medical Hx:   Lung cancer  Hypertension    Past Sx:  History of hemorrhoidectomy    Allergies:  No Known Allergies    Current Meds:   Standng Meds:  amLODIPine   Tablet 5 milliGRAM(s) Oral daily  docusate sodium 100 milliGRAM(s) Oral two times a day  heparin  Injectable 5000 Unit(s) SubCutaneous every 8 hours  metoprolol tartrate 25 milliGRAM(s) Oral two times a day  ondansetron    Tablet 4 milliGRAM(s) Oral every 8 hours  pantoprazole    Tablet 40 milliGRAM(s) Oral before breakfast  polyethylene glycol 3350 17 Gram(s) Oral two times a day  senna 2 Tablet(s) Oral at bedtime  trimethoprim/polymyxin Solution 1 Drop(s) Both EYES every 3 hours    PRN Meds:  morphine  - Injectable 4 milliGRAM(s) IV Push every 3 hours PRN Severe Pain (7 - 10)  oxyCODONE    IR 10 milliGRAM(s) Oral every 4 hours PRN Moderate Pain (4 - 6)  oxyCODONE    IR 5 milliGRAM(s) Oral every 4 hours PRN Mild Pain (1 - 3)    Vital Signs:   T(F): 100 (18 @ 08:00), Max: 100 (18 @ 08:00)  HR: 77 (18 @ 08:00) (64 - 78)  BP: 131/62 (18 @ 08:00) (93/70 - 160/77)  RR: 18 (18 @ 08:00) (11 - 22)  SpO2: 94% (18 @ 19:57) (94% - 99%)      18 @ 07:01  -  18 @ 07:00  --------------------------------------------------------  IN: 325 mL / OUT: 550 mL / NET: -225 mL        Physical Exam:   GENERAL: NAD, Lying in bed, pleasant and conversive.   HEENT: NCAT, PERRLA, EOMI, Bilateral green discharge from eyes; improved from yesterday  CHEST/LUNG: CTA, No wheezing, rhonchi, rales  HEART: Regular rate and rhythm; s1 s2 appreciated, No murmurs, rubs, or gallops  ABDOMEN: Soft, mildly tender, Nondistended; Bowel sounds present  EXTREMITIES: No LE edema b/l, Peripheral pulses 2+, No cyanosis, no clubbing, Muscle strength 3/5 in RLE, 5/5 in LLE and in bilateral UE   NERVOUS SYSTEM:  Alert & Oriented X3, Cranial nerves ii-xii grossly intact, sensation equal and intact    Labs:                         12.7   11.69 )-----------( 167      ( 2018 05:31 )             37.8     Neutophil% 79.5, Lymphocyte% 9.0, Monocyte% 9.8, Bands% 0.6 18 @ 05:31    2018 05:31    139    |  98     |  32     ----------------------------<  87     4.4     |  24     |  1.6      Ca    8.6        2018 05:31      Urinalysis Basic - ( 2018 11:00 )    Color: Yellow / Appearance: Clear / S.020 / pH: x  Gluc: x / Ketone: Negative  / Bili: Negative / Urobili: 0.2 mg/dL   Blood: x / Protein: 100 mg/dL / Nitrite: Negative   Leuk Esterase: Negative / RBC: x / WBC x   Sq Epi: x / Non Sq Epi: x / Bacteria: x    Radiology:   Xray Hip 2-3 Views, Right (18 @ 15:36)   IMPRESSION:    Status post right hip hemiarthroplasty.    Assessment and Plan:   This is a 78 year old male with PMHx of HTN, Anxiety, Stage 4 lung carcinoma (Non small cell) on brigatinib for 67months who initially presented to [facility] ER /2 mechanical fall. Patient was transferred to the [facility] after being found to have a right femoral head fracture.     #Right femoral head fracture 2/2 mechanical fall   - Orthopedics consulted; plan for OR today. Medical and cardiology attending notes with clearance    - Pain control: Morphine 4mg IV q4 PRN  - Zofran for nausea   - TSH elevated to 6.06     #Diffuse Osteopenia  - Outpatient DEXA    #Conjunctivitis  - Continue on Trimethoprin and Polymyxin     #DIDI vs CKD  - No history of CKD.  - Continue on D5 1/2NS @75 cc/hour  - Urine studies ordered today    #Stage 4 Non Small Cell Lung Cancer  - Taking Brigatinib at home, on hold while in patient  - Patient family concerned and has brought a few pills to the nurse. When patient can resume will need the med confirmed by the pharmacy.     #HTN  - On amlodpine 5mg daily and Metoprolol 25mg BID while inpatient  - Continue to monitor while inpatient.  - Home meds to1 be resumed on discharge    #Constipation  - Bowel regimen Colace, Senna     Activity: Bedrest  Diet: DASH  DVT ppx: Heparin SubQ  GI ppx: Protonix  Code Status: Full Code  Dispo: To OR today Hospital Day:  3d    Subjective:  Yesterday the patient had his ORIF and is POD#1. He reported that he has some pain in his right leg and that it is to be expected after surgery. Family member was present today who questioned about the patient's chemotherapy drug. Informed the family member that per my attending on the case we are going to be holding the chemotherapy agent while under inpatient status. He was seen and examined at bedside. No fevers, chills, nausea, vomiting, constipation, diarrhea, weakness, fatigue, chest pain, shortness of breath.     Past Medical Hx:   Lung cancer  Hypertension    Past Sx:  History of hemorrhoidectomy    Allergies:  No Known Allergies    Current Meds:   Standng Meds:  amLODIPine   Tablet 5 milliGRAM(s) Oral daily  docusate sodium 100 milliGRAM(s) Oral two times a day  heparin  Injectable 5000 Unit(s) SubCutaneous every 8 hours  metoprolol tartrate 25 milliGRAM(s) Oral two times a day  ondansetron    Tablet 4 milliGRAM(s) Oral every 8 hours  pantoprazole    Tablet 40 milliGRAM(s) Oral before breakfast  polyethylene glycol 3350 17 Gram(s) Oral two times a day  senna 2 Tablet(s) Oral at bedtime  trimethoprim/polymyxin Solution 1 Drop(s) Both EYES every 3 hours    PRN Meds:  morphine  - Injectable 4 milliGRAM(s) IV Push every 3 hours PRN Severe Pain (7 - 10)  oxyCODONE    IR 10 milliGRAM(s) Oral every 4 hours PRN Moderate Pain (4 - 6)  oxyCODONE    IR 5 milliGRAM(s) Oral every 4 hours PRN Mild Pain (1 - 3)    Vital Signs:   T(F): 100 (18 @ 08:00), Max: 100 (18 @ 08:00)  HR: 77 (18 @ 08:00) (64 - 78)  BP: 131/62 (18 @ 08:00) (93/70 - 160/77)  RR: 18 (18 @ 08:00) (11 - 22)  SpO2: 94% (18 @ 19:57) (94% - 99%)      18 @ 07:01  -  18 @ 07:00  --------------------------------------------------------  IN: 325 mL / OUT: 550 mL / NET: -225 mL        Physical Exam:   GENERAL: NAD, Lying in bed, pleasant and conversive.   HEENT: NCAT, PERRLA, EOMI, Bilateral green discharge from eyes; improved from yesterday  CHEST/LUNG: CTA, No wheezing, rhonchi, rales  HEART: Regular rate and rhythm; s1 s2 appreciated, No murmurs, rubs, or gallops  ABDOMEN: Soft, mildly tender, Nondistended; Bowel sounds present  EXTREMITIES: No LE edema b/l, Peripheral pulses 2+, No cyanosis, no clubbing, Muscle strength 3/5 in RLE, 5/5 in LLE and in bilateral UE   NERVOUS SYSTEM:  Alert & Oriented X3, Cranial nerves ii-xii grossly intact, sensation equal and intact    Labs:                         12.7   11.69 )-----------( 167      ( 2018 05:31 )             37.8     Neutophil% 79.5, Lymphocyte% 9.0, Monocyte% 9.8, Bands% 0.6 18 @ 05:31    2018 05:31    139    |  98     |  32     ----------------------------<  87     4.4     |  24     |  1.6      Ca    8.6        2018 05:31      Urinalysis Basic - ( 2018 11:00 )    Color: Yellow / Appearance: Clear / S.020 / pH: x  Gluc: x / Ketone: Negative  / Bili: Negative / Urobili: 0.2 mg/dL   Blood: x / Protein: 100 mg/dL / Nitrite: Negative   Leuk Esterase: Negative / RBC: x / WBC x   Sq Epi: x / Non Sq Epi: x / Bacteria: x    Radiology:   Xray Hip 2-3 Views, Right (18 @ 15:36)   IMPRESSION:    Status post right hip hemiarthroplasty.    Assessment and Plan:   This is a 78 year old male with PMHx of HTN, Anxiety, Stage 4 lung carcinoma (Non small cell) on brigatinib for 67months who initially presented to [facility] site /2 mechanical fall. Patient was transferred to the [facility] after being found to have a right femoral head fracture.     #Right femoral head fracture 2/2 mechanical fall   - Orthopedics consulted; POD #1  - Pain control: Morphine 4mg IV q4 PRN  - Zofran for nausea   - TSH elevated to 6.06     #Diffuse Osteopenia  - Outpatient DEXA    #Conjunctivitis  - Continue on Trimethoprin and Polymyxin     #DIDI vs CKD  - No history of CKD.  - Continue on D5 1/2NS @75 cc/hour  - Urine studies ordered today    #Stage 4 Non Small Cell Lung Cancer  - Taking Brigatinib at home, on hold while in patient  - Patient family concerned and has brought a few pills to the nurse. When patient can resume will need the med confirmed by the pharmacy.     #HTN  - On amlodpine 5mg daily and Metoprolol 25mg BID while inpatient  - Continue to monitor while inpatient.  - Home meds to1 be resumed on discharge    #Constipation  - Bowel regimen Colace, Senna     Activity: Bedrest  Diet: DASH  DVT ppx: Heparin SubQ  GI ppx: Protonix  Code Status: Full Code  Dispo: To OR today Hospital Day:  3d    Subjective:  Yesterday the patient had his ORIF and is POD#1. He reported that he has some pain in his right leg and that it is to be expected after surgery. Family member was present today who questioned about the patient's chemotherapy drug. Informed the family member that per my attending on the case we are going to be holding the chemotherapy agent while under inpatient status. He was seen and examined at bedside. No fevers, chills, nausea, vomiting, constipation, diarrhea, weakness, fatigue, chest pain, shortness of breath.     Past Medical Hx:   Lung cancer  Hypertension    Past Sx:  History of hemorrhoidectomy    Allergies:  No Known Allergies    Current Meds:   Standng Meds:  amLODIPine   Tablet 5 milliGRAM(s) Oral daily  docusate sodium 100 milliGRAM(s) Oral two times a day  heparin  Injectable 5000 Unit(s) SubCutaneous every 8 hours  metoprolol tartrate 25 milliGRAM(s) Oral two times a day  ondansetron    Tablet 4 milliGRAM(s) Oral every 8 hours  pantoprazole    Tablet 40 milliGRAM(s) Oral before breakfast  polyethylene glycol 3350 17 Gram(s) Oral two times a day  senna 2 Tablet(s) Oral at bedtime  trimethoprim/polymyxin Solution 1 Drop(s) Both EYES every 3 hours    PRN Meds:  morphine  - Injectable 4 milliGRAM(s) IV Push every 3 hours PRN Severe Pain (7 - 10)  oxyCODONE    IR 10 milliGRAM(s) Oral every 4 hours PRN Moderate Pain (4 - 6)  oxyCODONE    IR 5 milliGRAM(s) Oral every 4 hours PRN Mild Pain (1 - 3)    Vital Signs:   T(F): 100 (18 @ 08:00), Max: 100 (18 @ 08:00)  HR: 77 (18 @ 08:00) (64 - 78)  BP: 131/62 (18 @ 08:00) (93/70 - 160/77)  RR: 18 (18 @ 08:00) (11 - 22)  SpO2: 94% (18 @ 19:57) (94% - 99%)      18 @ 07:01  -  18 @ 07:00  --------------------------------------------------------  IN: 325 mL / OUT: 550 mL / NET: -225 mL        Physical Exam:   GENERAL: NAD, Lying in bed, pleasant and conversive.   HEENT: NCAT, PERRLA, EOMI, Bilateral green discharge from eyes; improved from yesterday  CHEST/LUNG: CTA, No wheezing, rhonchi, rales  HEART: Regular rate and rhythm; s1 s2 appreciated, No murmurs, rubs, or gallops  ABDOMEN: Soft, mildly tender, Nondistended; Bowel sounds present  EXTREMITIES: No LE edema b/l, Peripheral pulses 2+, No cyanosis, no clubbing, Muscle strength 3/5 in RLE, 5/5 in LLE and in bilateral UE   NERVOUS SYSTEM:  Alert & Oriented X3, Cranial nerves ii-xii grossly intact, sensation equal and intact    Labs:                         12.7   11.69 )-----------( 167      ( 2018 05:31 )             37.8     Neutophil% 79.5, Lymphocyte% 9.0, Monocyte% 9.8, Bands% 0.6 18 @ 05:31    2018 05:31    139    |  98     |  32     ----------------------------<  87     4.4     |  24     |  1.6      Ca    8.6        2018 05:31      Urinalysis Basic - ( 2018 11:00 )    Color: Yellow / Appearance: Clear / S.020 / pH: x  Gluc: x / Ketone: Negative  / Bili: Negative / Urobili: 0.2 mg/dL   Blood: x / Protein: 100 mg/dL / Nitrite: Negative   Leuk Esterase: Negative / RBC: x / WBC x   Sq Epi: x / Non Sq Epi: x / Bacteria: x    Radiology:   Xray Hip 2-3 Views, Right (18 @ 15:36)   IMPRESSION:    Status post right hip hemiarthroplasty.    Assessment and Plan:   This is a 78 year old male with PMHx of HTN, Anxiety, Stage 4 lung carcinoma (Non small cell) on brigatinib for 67months who initially presented to [facility] site /2 mechanical fall. Patient was transferred to the [facility] after being found to have a right femoral head fracture.     #Right femoral head fracture 2/2 mechanical fall   - Orthopedics consulted; POD #1  - Pain control: Morphine 4mg IV q4 PRN  - Zofran for nausea   - TSH elevated to 6.06     #Diffuse Osteopenia  - Outpatient DEXA    #Conjunctivitis  - Continue on Trimethoprin and Polymyxin     #DIDI vs CKD  - No history of CKD.  - Continue on D5 1/2NS @75 cc/hour  - Urine studies ordered today    #Stage 4 Non Small Cell Lung Cancer  - Taking Brigatinib at home, on hold while in patient  - Patient family concerned and has brought a few pills to the nurse. When patient can resume will need the med confirmed by the pharmacy.     #HTN  - On amlodpine 5mg daily and Metoprolol 25mg BID while inpatient  - Continue to monitor while inpatient.  - Home meds to be resumed on discharge    #Constipation  - Bowel regimen Colace, Senna     Activity: Bedrest  Diet: DASH  DVT ppx: Heparin SubQ  GI ppx: Protonix  Code Status: Full Code  Dispo: To OR today

## 2018-11-17 NOTE — PHYSICAL THERAPY INITIAL EVALUATION ADULT - TRANSFER SAFETY CONCERNS NOTED: BED/CHAIR, REHAB EVAL
decreased weight-shifting ability/decreased balance during turns/inability to maintain weight-bearing restrictions w/o assist/decreased sequencing ability

## 2018-11-17 NOTE — PHYSICAL THERAPY INITIAL EVALUATION ADULT - GENERAL OBSERVATIONS, REHAB EVAL
Patient encountered lying in bed. Agreed to participate in therapy. States he lives with sister and nephew.

## 2018-11-17 NOTE — CHART NOTE - NSCHARTNOTEFT_GEN_A_CORE
Discussed with Ortho, no further intervention. Patient can resume his home Brigatinib. Non formulary form completed and medication sent to pharmacy for review.

## 2018-11-17 NOTE — PHYSICAL THERAPY INITIAL EVALUATION ADULT - TRANSFER SAFETY CONCERNS NOTED: SIT/STAND, REHAB EVAL
stand pivot/inability to maintain weight-bearing restrictions w/o assist/decreased weight-shifting ability/decreased balance during turns

## 2018-11-17 NOTE — PHYSICAL THERAPY INITIAL EVALUATION ADULT - PLANNED THERAPY INTERVENTIONS, PT EVAL
strengthening/bed mobility training/gait training/joint mobilization/neuromuscular re-education/ROM/transfer training/balance training

## 2018-11-17 NOTE — PHARMACOTHERAPY INTERVENTION NOTE - COMMENTS
Prescriber and nurse were contacted with recommendation to contact family for phone number of pharmacy.  [facility] (specialized pharmacy) was contacted to verify dose and frequency of medication.  Address and phone number of pharmacy is [phone] and [address] [id number]

## 2018-11-17 NOTE — PROGRESS NOTE ADULT - SUBJECTIVE AND OBJECTIVE BOX
Patient was seen and examined. Spoke with RN. Chart reviewed.    No events overnight.  Vital Signs Last 24 Hrs  T(F): 96.7 (2018 12:09), Max: 100 (2018 08:00)  HR: 80 (2018 12:09) (64 - 80)  BP: 134/64 (2018 12:09) (93/70 - 160/77)  SpO2: 94% (2018 19:57) (94% - 99%)  MEDICATIONS  (STANDING):  amLODIPine   Tablet 5 milliGRAM(s) Oral daily  docusate sodium 100 milliGRAM(s) Oral two times a day  heparin  Injectable 5000 Unit(s) SubCutaneous every 8 hours  metoprolol tartrate 25 milliGRAM(s) Oral two times a day  ondansetron    Tablet 4 milliGRAM(s) Oral every 8 hours  pantoprazole    Tablet 40 milliGRAM(s) Oral before breakfast  polyethylene glycol 3350 17 Gram(s) Oral two times a day  senna 2 Tablet(s) Oral at bedtime  trimethoprim/polymyxin Solution 1 Drop(s) Both EYES every 3 hours    MEDICATIONS  (PRN):  morphine  - Injectable 4 milliGRAM(s) IV Push every 3 hours PRN Severe Pain (7 - 10)  oxyCODONE    IR 10 milliGRAM(s) Oral every 4 hours PRN Moderate Pain (4 - 6)  oxyCODONE    IR 5 milliGRAM(s) Oral every 4 hours PRN Mild Pain (1 - 3)    Labs:                        12.7   11.69 )-----------( 167      ( 2018 05:31 )             37.8                         14.0   15.58 )-----------( 179      ( 2018 16:15 )             42.6     2018 05:31    139    |  98     |  32     ----------------------------<  87     4.4     |  24     |  1.6    2018 16:15    141    |  102    |  31     ----------------------------<  114    4.0     |  22     |  1.6      Ca    8.6        2018 05:31  Ca    9.2        2018 16:15      PT/INR - ( 2018 05:10 )   PT: 11.80 sec;   INR: 1.03 ratio         PTT - ( 2018 05:10 )  PTT:30.6 sec  Urinalysis Basic - ( 2018 11:00 )    Color: Yellow / Appearance: Clear / S.020 / pH: x  Gluc: x / Ketone: Negative  / Bili: Negative / Urobili: 0.2 mg/dL   Blood: x / Protein: 100 mg/dL / Nitrite: Negative   Leuk Esterase: Negative / RBC: x / WBC x   Sq Epi: x / Non Sq Epi: x / Bacteria: x          Radiology:    General: comfortable, NAD  Neurology: A&Ox3, nonfocal  Head:  Normocephalic, atraumatic  ENT:  Mucosa moist, no ulcerations  Neck:  Supple, no JVD,   Resp: CTA B/L  CV: RRR, S1S2,   GI: Soft, NT, bowel sounds  MS: No edema, + peripheral pulses, FROM all 4 extremity

## 2018-11-18 LAB
ANION GAP SERPL CALC-SCNC: 17 MMOL/L — HIGH (ref 7–14)
BASOPHILS # BLD AUTO: 0.06 K/UL — SIGNIFICANT CHANGE UP (ref 0–0.2)
BASOPHILS NFR BLD AUTO: 0.5 % — SIGNIFICANT CHANGE UP (ref 0–1)
BUN SERPL-MCNC: 33 MG/DL — HIGH (ref 10–20)
CALCIUM SERPL-MCNC: 8.8 MG/DL — SIGNIFICANT CHANGE UP (ref 8.5–10.1)
CHLORIDE SERPL-SCNC: 98 MMOL/L — SIGNIFICANT CHANGE UP (ref 98–110)
CO2 SERPL-SCNC: 24 MMOL/L — SIGNIFICANT CHANGE UP (ref 17–32)
CREAT SERPL-MCNC: 1.6 MG/DL — HIGH (ref 0.7–1.5)
EOSINOPHIL # BLD AUTO: 0.08 K/UL — SIGNIFICANT CHANGE UP (ref 0–0.7)
EOSINOPHIL NFR BLD AUTO: 0.7 % — SIGNIFICANT CHANGE UP (ref 0–8)
GLUCOSE SERPL-MCNC: 96 MG/DL — SIGNIFICANT CHANGE UP (ref 70–99)
HCT VFR BLD CALC: 40 % — LOW (ref 42–52)
HGB BLD-MCNC: 13.1 G/DL — LOW (ref 14–18)
IMM GRANULOCYTES NFR BLD AUTO: 0.6 % — HIGH (ref 0.1–0.3)
LYMPHOCYTES # BLD AUTO: 1.6 K/UL — SIGNIFICANT CHANGE UP (ref 1.2–3.4)
LYMPHOCYTES # BLD AUTO: 13.2 % — LOW (ref 20.5–51.1)
MCHC RBC-ENTMCNC: 27.6 PG — SIGNIFICANT CHANGE UP (ref 27–31)
MCHC RBC-ENTMCNC: 32.8 G/DL — SIGNIFICANT CHANGE UP (ref 32–37)
MCV RBC AUTO: 84.4 FL — SIGNIFICANT CHANGE UP (ref 80–94)
MONOCYTES # BLD AUTO: 1.42 K/UL — HIGH (ref 0.1–0.6)
MONOCYTES NFR BLD AUTO: 11.8 % — HIGH (ref 1.7–9.3)
NEUTROPHILS # BLD AUTO: 8.85 K/UL — HIGH (ref 1.4–6.5)
NEUTROPHILS NFR BLD AUTO: 73.2 % — SIGNIFICANT CHANGE UP (ref 42.2–75.2)
NRBC # BLD: 0 /100 WBCS — SIGNIFICANT CHANGE UP (ref 0–0)
PLATELET # BLD AUTO: 192 K/UL — SIGNIFICANT CHANGE UP (ref 130–400)
POTASSIUM SERPL-MCNC: 4.4 MMOL/L — SIGNIFICANT CHANGE UP (ref 3.5–5)
POTASSIUM SERPL-SCNC: 4.4 MMOL/L — SIGNIFICANT CHANGE UP (ref 3.5–5)
RBC # BLD: 4.74 M/UL — SIGNIFICANT CHANGE UP (ref 4.7–6.1)
RBC # FLD: 15.7 % — HIGH (ref 11.5–14.5)
SODIUM SERPL-SCNC: 139 MMOL/L — SIGNIFICANT CHANGE UP (ref 135–146)
WBC # BLD: 12.08 K/UL — HIGH (ref 4.8–10.8)
WBC # FLD AUTO: 12.08 K/UL — HIGH (ref 4.8–10.8)

## 2018-11-18 RX ORDER — SENNA PLUS 8.6 MG/1
2 TABLET ORAL AT BEDTIME
Qty: 0 | Refills: 0 | Status: DISCONTINUED | OUTPATIENT
Start: 2018-11-18 | End: 2018-11-20

## 2018-11-18 RX ORDER — MAGNESIUM HYDROXIDE 400 MG/1
30 TABLET, CHEWABLE ORAL DAILY
Qty: 0 | Refills: 0 | Status: DISCONTINUED | OUTPATIENT
Start: 2018-11-18 | End: 2018-11-20

## 2018-11-18 RX ADMIN — HEPARIN SODIUM 5000 UNIT(S): 5000 INJECTION INTRAVENOUS; SUBCUTANEOUS at 05:57

## 2018-11-18 RX ADMIN — Medication 100 MILLIGRAM(S): at 17:48

## 2018-11-18 RX ADMIN — ONDANSETRON 4 MILLIGRAM(S): 8 TABLET, FILM COATED ORAL at 22:38

## 2018-11-18 RX ADMIN — HEPARIN SODIUM 5000 UNIT(S): 5000 INJECTION INTRAVENOUS; SUBCUTANEOUS at 14:22

## 2018-11-18 RX ADMIN — Medication 1 DROP(S): at 08:56

## 2018-11-18 RX ADMIN — Medication 1 DROP(S): at 03:11

## 2018-11-18 RX ADMIN — Medication 25 MILLIGRAM(S): at 17:48

## 2018-11-18 RX ADMIN — Medication 1 DROP(S): at 05:56

## 2018-11-18 RX ADMIN — SENNA PLUS 2 TABLET(S): 8.6 TABLET ORAL at 22:37

## 2018-11-18 RX ADMIN — Medication 25 MILLIGRAM(S): at 05:56

## 2018-11-18 RX ADMIN — Medication 1 DROP(S): at 22:37

## 2018-11-18 RX ADMIN — AMLODIPINE BESYLATE 5 MILLIGRAM(S): 2.5 TABLET ORAL at 05:56

## 2018-11-18 RX ADMIN — Medication 1 DROP(S): at 14:24

## 2018-11-18 RX ADMIN — POLYETHYLENE GLYCOL 3350 17 GRAM(S): 17 POWDER, FOR SOLUTION ORAL at 17:48

## 2018-11-18 RX ADMIN — Medication 1 DROP(S): at 17:49

## 2018-11-18 RX ADMIN — HEPARIN SODIUM 5000 UNIT(S): 5000 INJECTION INTRAVENOUS; SUBCUTANEOUS at 22:38

## 2018-11-18 RX ADMIN — Medication 100 MILLIGRAM(S): at 05:56

## 2018-11-18 RX ADMIN — POLYETHYLENE GLYCOL 3350 17 GRAM(S): 17 POWDER, FOR SOLUTION ORAL at 05:58

## 2018-11-18 NOTE — PROGRESS NOTE ADULT - SUBJECTIVE AND OBJECTIVE BOX
Patient was seen and examined. Spoke with RN. Chart reviewed.  No events overnight, but still in pain. Awaiting PT and ortho f/u  Vital Signs Last 24 Hrs  T(F): 99.5 (2018 00:10), Max: 99.5 (2018 00:10)  HR: 80 (2018 00:10) (80 - 83)  BP: 130/68 (2018 00:10) (129/62 - 134/64)  SpO2: --  MEDICATIONS  (STANDING):  amLODIPine   Tablet 5 milliGRAM(s) Oral daily  brigatinib 90 milliGRAM(s) 90 milliGRAM(s) Oral at bedtime  docusate sodium 100 milliGRAM(s) Oral two times a day  heparin  Injectable 5000 Unit(s) SubCutaneous every 8 hours  metoprolol tartrate 25 milliGRAM(s) Oral two times a day  ondansetron    Tablet 4 milliGRAM(s) Oral every 8 hours  pantoprazole    Tablet 40 milliGRAM(s) Oral before breakfast  polyethylene glycol 3350 17 Gram(s) Oral two times a day  senna 2 Tablet(s) Oral at bedtime  trimethoprim/polymyxin Solution 1 Drop(s) Both EYES every 3 hours    MEDICATIONS  (PRN):  morphine  - Injectable 4 milliGRAM(s) IV Push every 3 hours PRN Severe Pain (7 - 10)  oxyCODONE    IR 10 milliGRAM(s) Oral every 4 hours PRN Moderate Pain (4 - 6)  oxyCODONE    IR 5 milliGRAM(s) Oral every 4 hours PRN Mild Pain (1 - 3)    Labs:                        13.1   12.08 )-----------( 192      ( 2018 05:57 )             40.0                         12.7   11.69 )-----------( 167      ( 2018 05:31 )             37.8     2018 05:31    139    |  98     |  32     ----------------------------<  87     4.4     |  24     |  1.6    2018 16:15    141    |  102    |  31     ----------------------------<  114    4.0     |  22     |  1.6      Ca    8.6        2018 05:31  Ca    9.2        2018 16:15        Urinalysis Basic - ( 2018 11:00 )    Color: Yellow / Appearance: Clear / S.020 / pH: x  Gluc: x / Ketone: Negative  / Bili: Negative / Urobili: 0.2 mg/dL   Blood: x / Protein: 100 mg/dL / Nitrite: Negative   Leuk Esterase: Negative / RBC: x / WBC x   Sq Epi: x / Non Sq Epi: x / Bacteria: x        General: comfortable, NAD  Neurology: A&Ox3, nonfocal  Head:  Normocephalic, atraumatic  ENT:  Mucosa moist, no ulcerations  Neck:  Supple, no JVD,   Skin: no breakdowns (as per RN)  Resp: CTA B/L  CV: RRR, S1S2,   GI: Soft, NT, bowel sounds  MS: No edema, + peripheral pulses, FROM all 4 extremity      A/P:  78 year old male with PMHx of HTN, Anxiety, Stage 4 lung carcinoma (Non small cell) on brigatinib for 67months who initially presented to [facility] /2 mechanical fall. Patient was transferred to the [facility] after being found to have a right femoral head fracture.     #Right femoral head fracture 2/2 mechanical fall   -  POD #2  - Pain control: increase Morphine as needed  - Zofran for nausea   - need ortho f/u to determine need for pain management    #Diffuse Osteopenia  - Outpatient DEXA    #Conjunctivitis  - Continue on abx    #DIDI vs CKD  - No history of CKD.  - Continue on D5 1/2NS @75 cc/hour    #Stage 4 Non Small Cell Lung Cancer  - Taking Brigatinib at home, on hold while in patient  - Resume when ok with his oncologist    #HTN  - On amlodpine 5mg daily and Metoprolol 25mg BID while inpatient  - Continue to monitor while inpatient.  - Home meds to be resumed on discharge    #Constipation  - Bowel regimen Colace, Senna     PT/rehab    OOB with assistance      DVT prophylaxis  Decubitus prevention- all measures as per RN protocol  Please call or text me with any questions or updates

## 2018-11-18 NOTE — PROGRESS NOTE ADULT - SUBJECTIVE AND OBJECTIVE BOX
Patient seen and examined at bedside in the am. No acute events overnight. Patient resting comfortably in bed, pain well controlled.     Physical Exam    Vital Signs Last 24 Hrs  T(C): 37.1 (18 Nov 2018 08:00), Max: 37.5 (18 Nov 2018 00:10)  T(F): 98.7 (18 Nov 2018 08:00), Max: 99.5 (18 Nov 2018 00:10)  HR: 72 (18 Nov 2018 08:00) (72 - 83)  BP: 154/69 (18 Nov 2018 08:00) (129/62 - 154/69)  BP(mean): --  RR: 18 (18 Nov 2018 08:00) (18 - 18)  SpO2: --    NAD, AOx3  Non-labored breathing  RLE  Dressings C/D/I  Thigh soft, compressible  Compartments soft  EHL/FHL/TA/GA Motor intact   SP/DP/T/S/S SILT distally  Toes WWP    Labs                        13.1   12.08 )-----------( 192      ( 18 Nov 2018 05:57 )             40.0   11-17    139  |  98  |  32<H>  ----------------------------<  87  4.4   |  24  |  1.6<H>    Ca    8.6      17 Nov 2018 05:31        A/P  78M POD#2 s/p R anabel arthroplasty     TTWB  Pain control  DVT ppx  Continue med mgmt per primary team  Can resume oncology medications Patient seen and examined at bedside in the am. No acute events overnight. Patient resting comfortably in bed, pain well controlled. No BM in 4 days.    Physical Exam    Vital Signs Last 24 Hrs  T(C): 37.1 (18 Nov 2018 08:00), Max: 37.5 (18 Nov 2018 00:10)  T(F): 98.7 (18 Nov 2018 08:00), Max: 99.5 (18 Nov 2018 00:10)  HR: 72 (18 Nov 2018 08:00) (72 - 83)  BP: 154/69 (18 Nov 2018 08:00) (129/62 - 154/69)  BP(mean): --  RR: 18 (18 Nov 2018 08:00) (18 - 18)  SpO2: --    NAD, AOx3  Non-labored breathing  RLE  Dressings C/D/I  Thigh soft, compressible  Compartments soft  EHL/FHL/TA/GA Motor intact   SP/DP/T/S/S SILT distally  Toes WWP    Labs                        13.1   12.08 )-----------( 192      ( 18 Nov 2018 05:57 )             40.0   11-17    139  |  98  |  32<H>  ----------------------------<  87  4.4   |  24  |  1.6<H>    Ca    8.6      17 Nov 2018 05:31        A/P  78M POD#2 s/p R anabel arthroplasty     TTWB  Pain control  DVT ppx  Continue med mgmt per primary team  Bowel regiment   Can resume oncology medications   PT/OT  OOBTC/IS

## 2018-11-18 NOTE — PROGRESS NOTE ADULT - ATTENDING COMMENTS
pt seen and examined.  agree with resident exam and plan.  wbat, oob, pt, is, dvt proph, pain control, check H&H.
pt seen and examined.  agree with resident exam and plan.    wbat, oob, pt, is, dvt proph, pain control.

## 2018-11-19 LAB
ANION GAP SERPL CALC-SCNC: 10 MMOL/L — SIGNIFICANT CHANGE UP (ref 7–14)
BASOPHILS # BLD AUTO: 0.06 K/UL — SIGNIFICANT CHANGE UP (ref 0–0.2)
BASOPHILS NFR BLD AUTO: 0.5 % — SIGNIFICANT CHANGE UP (ref 0–1)
BUN SERPL-MCNC: 32 MG/DL — HIGH (ref 10–20)
CALCIUM SERPL-MCNC: 8.6 MG/DL — SIGNIFICANT CHANGE UP (ref 8.5–10.1)
CHLORIDE SERPL-SCNC: 103 MMOL/L — SIGNIFICANT CHANGE UP (ref 98–110)
CO2 SERPL-SCNC: 24 MMOL/L — SIGNIFICANT CHANGE UP (ref 17–32)
CREAT SERPL-MCNC: 1.4 MG/DL — SIGNIFICANT CHANGE UP (ref 0.7–1.5)
EOSINOPHIL # BLD AUTO: 0.16 K/UL — SIGNIFICANT CHANGE UP (ref 0–0.7)
EOSINOPHIL NFR BLD AUTO: 1.3 % — SIGNIFICANT CHANGE UP (ref 0–8)
GLUCOSE SERPL-MCNC: 96 MG/DL — SIGNIFICANT CHANGE UP (ref 70–99)
HCT VFR BLD CALC: 35.3 % — LOW (ref 42–52)
HGB BLD-MCNC: 11.9 G/DL — LOW (ref 14–18)
IMM GRANULOCYTES NFR BLD AUTO: 0.6 % — HIGH (ref 0.1–0.3)
LYMPHOCYTES # BLD AUTO: 17.1 % — LOW (ref 20.5–51.1)
LYMPHOCYTES # BLD AUTO: 2.17 K/UL — SIGNIFICANT CHANGE UP (ref 1.2–3.4)
MCHC RBC-ENTMCNC: 28 PG — SIGNIFICANT CHANGE UP (ref 27–31)
MCHC RBC-ENTMCNC: 33.7 G/DL — SIGNIFICANT CHANGE UP (ref 32–37)
MCV RBC AUTO: 83.1 FL — SIGNIFICANT CHANGE UP (ref 80–94)
MONOCYTES # BLD AUTO: 1.31 K/UL — HIGH (ref 0.1–0.6)
MONOCYTES NFR BLD AUTO: 10.3 % — HIGH (ref 1.7–9.3)
NEUTROPHILS # BLD AUTO: 8.91 K/UL — HIGH (ref 1.4–6.5)
NEUTROPHILS NFR BLD AUTO: 70.2 % — SIGNIFICANT CHANGE UP (ref 42.2–75.2)
NRBC # BLD: 0 /100 WBCS — SIGNIFICANT CHANGE UP (ref 0–0)
PLATELET # BLD AUTO: 186 K/UL — SIGNIFICANT CHANGE UP (ref 130–400)
POTASSIUM SERPL-MCNC: 4.2 MMOL/L — SIGNIFICANT CHANGE UP (ref 3.5–5)
POTASSIUM SERPL-SCNC: 4.2 MMOL/L — SIGNIFICANT CHANGE UP (ref 3.5–5)
RBC # BLD: 4.25 M/UL — LOW (ref 4.7–6.1)
RBC # FLD: 15.8 % — HIGH (ref 11.5–14.5)
SODIUM SERPL-SCNC: 137 MMOL/L — SIGNIFICANT CHANGE UP (ref 135–146)
WBC # BLD: 12.69 K/UL — HIGH (ref 4.8–10.8)
WBC # FLD AUTO: 12.69 K/UL — HIGH (ref 4.8–10.8)

## 2018-11-19 RX ORDER — LACTULOSE 10 G/15ML
20 SOLUTION ORAL
Qty: 0 | Refills: 0 | Status: DISCONTINUED | OUTPATIENT
Start: 2018-11-19 | End: 2018-11-19

## 2018-11-19 RX ADMIN — HEPARIN SODIUM 5000 UNIT(S): 5000 INJECTION INTRAVENOUS; SUBCUTANEOUS at 05:35

## 2018-11-19 RX ADMIN — SENNA PLUS 2 TABLET(S): 8.6 TABLET ORAL at 21:31

## 2018-11-19 RX ADMIN — Medication 100 MILLIGRAM(S): at 05:37

## 2018-11-19 RX ADMIN — Medication 1 DROP(S): at 05:38

## 2018-11-19 RX ADMIN — ONDANSETRON 4 MILLIGRAM(S): 8 TABLET, FILM COATED ORAL at 05:38

## 2018-11-19 RX ADMIN — Medication 25 MILLIGRAM(S): at 17:08

## 2018-11-19 RX ADMIN — PANTOPRAZOLE SODIUM 40 MILLIGRAM(S): 20 TABLET, DELAYED RELEASE ORAL at 07:40

## 2018-11-19 RX ADMIN — Medication 1 DROP(S): at 21:31

## 2018-11-19 RX ADMIN — AMLODIPINE BESYLATE 5 MILLIGRAM(S): 2.5 TABLET ORAL at 05:37

## 2018-11-19 RX ADMIN — Medication 25 MILLIGRAM(S): at 05:37

## 2018-11-19 RX ADMIN — Medication 1 DROP(S): at 01:28

## 2018-11-19 RX ADMIN — POLYETHYLENE GLYCOL 3350 17 GRAM(S): 17 POWDER, FOR SOLUTION ORAL at 17:09

## 2018-11-19 RX ADMIN — HEPARIN SODIUM 5000 UNIT(S): 5000 INJECTION INTRAVENOUS; SUBCUTANEOUS at 14:21

## 2018-11-19 RX ADMIN — Medication 100 MILLIGRAM(S): at 17:08

## 2018-11-19 RX ADMIN — POLYETHYLENE GLYCOL 3350 17 GRAM(S): 17 POWDER, FOR SOLUTION ORAL at 05:37

## 2018-11-19 RX ADMIN — HEPARIN SODIUM 5000 UNIT(S): 5000 INJECTION INTRAVENOUS; SUBCUTANEOUS at 21:31

## 2018-11-19 RX ADMIN — Medication 1 DROP(S): at 17:08

## 2018-11-19 NOTE — OCCUPATIONAL THERAPY INITIAL EVALUATION ADULT - PLANNED THERAPY INTERVENTIONS, OT EVAL
parent/caregiver training.../ADL retraining/bed mobility training/strengthening/transfer training/balance training/ROM

## 2018-11-19 NOTE — OCCUPATIONAL THERAPY INITIAL EVALUATION ADULT - LIVES WITH, PROFILE
sister and nephew, private home with private apartment, +2 stairs to enter, no stairs within his apartment, +stairs to nephew's portion of home, + walk in shower

## 2018-11-19 NOTE — OCCUPATIONAL THERAPY INITIAL EVALUATION ADULT - GENERAL OBSERVATIONS, REHAB EVAL
pt received seated in b/s chair in NAD, agreeable to OT evaluation, left seated in b/s chair following OT evaluation, RN aware

## 2018-11-19 NOTE — PROGRESS NOTE ADULT - SUBJECTIVE AND OBJECTIVE BOX
Patient was seen and examined. Spoke with RN. Chart reviewed.    No events overnight.  Vital Signs Last 24 Hrs  T(F): 98.8 (19 Nov 2018 07:41), Max: 99.2 (18 Nov 2018 20:00)  HR: 63 (19 Nov 2018 07:41) (63 - 80)  BP: 131/66 (19 Nov 2018 07:41) (131/66 - 139/61)  SpO2: --  MEDICATIONS  (STANDING):  amLODIPine   Tablet 5 milliGRAM(s) Oral daily  brigatinib 90 milliGRAM(s) 90 milliGRAM(s) Oral at bedtime  docusate sodium 100 milliGRAM(s) Oral two times a day  heparin  Injectable 5000 Unit(s) SubCutaneous every 8 hours  metoprolol tartrate 25 milliGRAM(s) Oral two times a day  ondansetron    Tablet 4 milliGRAM(s) Oral every 8 hours  pantoprazole    Tablet 40 milliGRAM(s) Oral before breakfast  polyethylene glycol 3350 17 Gram(s) Oral two times a day  senna 2 Tablet(s) Oral at bedtime  senna 2 Tablet(s) Oral at bedtime  trimethoprim/polymyxin Solution 1 Drop(s) Both EYES every 3 hours    MEDICATIONS  (PRN):  bisacodyl 5 milliGRAM(s) Oral every 12 hours PRN Constipation  magnesium hydroxide Suspension 30 milliLiter(s) Oral daily PRN Constipation  morphine  - Injectable 4 milliGRAM(s) IV Push every 3 hours PRN Severe Pain (7 - 10)  oxyCODONE    IR 10 milliGRAM(s) Oral every 4 hours PRN Moderate Pain (4 - 6)  oxyCODONE    IR 5 milliGRAM(s) Oral every 4 hours PRN Mild Pain (1 - 3)    Labs:                        11.9   12.69 )-----------( 186      ( 19 Nov 2018 05:13 )             35.3                         13.1   12.08 )-----------( 192      ( 18 Nov 2018 05:57 )             40.0     19 Nov 2018 05:13    137    |  103    |  32     ----------------------------<  96     4.2     |  24     |  1.4    18 Nov 2018 05:57    139    |  98     |  33     ----------------------------<  96     4.4     |  24     |  1.6      Ca    8.6        19 Nov 2018 05:13  Ca    8.8        18 Nov 2018 05:57              Radiology:    General: comfortable, NAD  Neurology: A&Ox3, nonfocal  Head:  Normocephalic, atraumatic  ENT:  Mucosa moist, no ulcerations  Neck:  Supple, no JVD,   Skin: no breakdowns (as per RN)  Resp: CTA B/L  CV: RRR, S1S2,   GI: Soft, NT, bowel sounds  MS: No edema, + peripheral pulses, FROM all 4 extremity

## 2018-11-19 NOTE — PROGRESS NOTE ADULT - SUBJECTIVE AND OBJECTIVE BOX
NEPHROLOGY FOLLOW UP NOTE    pt seen and examined  no new events  still with hip discomfort  tolerating pt  cr stable    PAST MEDICAL & SURGICAL HISTORY:  Lung cancer  Hypertension  History of hemorrhoidectomy    Allergies:  No Known Allergies    Home Medications Reviewed    SOCIAL HISTORY:  Denies ETOH,Smoking,   FAMILY HISTORY:  No pertinent family history in first degree relatives        REVIEW OF SYSTEMS:  All other review of systems is negative unless indicated above.    advance care planning reviewed and d/w pt in detail    PHYSICAL EXAM:  NAD  awake and alert  moist mm  no jvd  cat bl  rrr  soft, nt  right leg shortened and rotated  no cvat  no rash    Hospital Medications:   MEDICATIONS  (STANDING):  amLODIPine   Tablet 5 milliGRAM(s) Oral daily  brigatinib 90 milliGRAM(s) 90 milliGRAM(s) Oral at bedtime  docusate sodium 100 milliGRAM(s) Oral two times a day  heparin  Injectable 5000 Unit(s) SubCutaneous every 8 hours  metoprolol tartrate 25 milliGRAM(s) Oral two times a day  ondansetron    Tablet 4 milliGRAM(s) Oral every 8 hours  pantoprazole    Tablet 40 milliGRAM(s) Oral before breakfast  polyethylene glycol 3350 17 Gram(s) Oral two times a day  senna 2 Tablet(s) Oral at bedtime  senna 2 Tablet(s) Oral at bedtime  trimethoprim/polymyxin Solution 1 Drop(s) Both EYES every 3 hours        VITALS:  T(F): 98.8 (18 @ 07:41), Max: 99.2 (18 @ 20:00)  HR: 63 (18 @ 07:41)  BP: 131/66 (18 @ 07:41)  RR: 18 (18 @ 07:41)  SpO2: --  Wt(kg): --     @ 07:01  -   @ 07:00  --------------------------------------------------------  IN: 480 mL / OUT: 0 mL / NET: 480 mL     @ 07:01  -   @ 07:00  --------------------------------------------------------  IN: 480 mL / OUT: 0 mL / NET: 480 mL     @ 07:01  -   @ 15:23  --------------------------------------------------------  IN: 120 mL / OUT: 0 mL / NET: 120 mL          LABS:      137  |  103  |  32<H>  ----------------------------<  96  4.2   |  24  |  1.4    Ca    8.6      2018 05:13                            11.9   12.69 )-----------( 186      ( 2018 05:13 )             35.3       Urine Studies:  Urinalysis Basic - ( 2018 11:00 )    Color: Yellow / Appearance: Clear / S.020 / pH:   Gluc:  / Ketone: Negative  / Bili: Negative / Urobili: 0.2 mg/dL   Blood:  / Protein: 100 mg/dL / Nitrite: Negative   Leuk Esterase: Negative / RBC:  / WBC    Sq Epi:  / Non Sq Epi:  / Bacteria:       Protein/Creatinine Ratio Calculation: 0.7 Ratio ( @ 11:00)  Creatinine, Random Urine: 117 mg/dL ( @ 11:00)      RADIOLOGY & ADDITIONAL STUDIES:

## 2018-11-19 NOTE — PROGRESS NOTE ADULT - SUBJECTIVE AND OBJECTIVE BOX
SUBJECTIVE:    Patient is a 78y old Male who presents with a chief complaint of s/p mechanical fall (19 Nov 2018 15:22)  Currently admitted to medicine with the primary diagnosis of Hip fracture  Today is hospital day 5d. This morning he is resting comfortably in bed and reports no new issues or overnight events.   pt reports pain on movement or ambulation. refused to work with PT today.     PAST MEDICAL & SURGICAL HISTORY  Lung cancer  Hypertension  History of hemorrhoidectomy    SOCIAL HISTORY:  Negative for smoking/alcohol/drug use.     ALLERGIES:  No Known Allergies    MEDICATIONS:  STANDING MEDICATIONS  amLODIPine   Tablet 5 milliGRAM(s) Oral daily  brigatinib 90 milliGRAM(s) 90 milliGRAM(s) Oral at bedtime  docusate sodium 100 milliGRAM(s) Oral two times a day  heparin  Injectable 5000 Unit(s) SubCutaneous every 8 hours  metoprolol tartrate 25 milliGRAM(s) Oral two times a day  ondansetron    Tablet 4 milliGRAM(s) Oral every 8 hours  pantoprazole    Tablet 40 milliGRAM(s) Oral before breakfast  polyethylene glycol 3350 17 Gram(s) Oral two times a day  senna 2 Tablet(s) Oral at bedtime  senna 2 Tablet(s) Oral at bedtime  trimethoprim/polymyxin Solution 1 Drop(s) Both EYES every 3 hours    PRN MEDICATIONS  bisacodyl 5 milliGRAM(s) Oral every 12 hours PRN  magnesium hydroxide Suspension 30 milliLiter(s) Oral daily PRN  morphine  - Injectable 4 milliGRAM(s) IV Push every 3 hours PRN  oxyCODONE    IR 10 milliGRAM(s) Oral every 4 hours PRN  oxyCODONE    IR 5 milliGRAM(s) Oral every 4 hours PRN    VITALS:   T(F): 98.8  HR: 78  BP: 106/62  RR: 18  SpO2: --    LABS:                        11.9   12.69 )-----------( 186      ( 19 Nov 2018 05:13 )             35.3     11-19    137  |  103  |  32<H>  ----------------------------<  96  4.2   |  24  |  1.4    Ca    8.6      19 Nov 2018 05:13      PHYSICAL EXAM:  GGENERAL: NAD  HEENT: NCAT, PERRLA, EOMI  CHEST/LUNG: CTA, No wheezing, rhonchi, rales  HEART: Regular rate and rhythm; s1 s2 appreciated, No murmurs, rubs, or gallops  ABDOMEN: Soft, mildly tender, Nondistended; Bowel sounds present  EXTREMITIES: No LE edema b/l, Peripheral pulses 2+, No cyanosis, no clubbing  NERVOUS SYSTEM:  Alert & Oriented X3, Cranial nerves ii-xii grossly intact, sensation equal and intact

## 2018-11-19 NOTE — PROGRESS NOTE ADULT - ASSESSMENT
78 year old male with PMHx of HTN, Anxiety, Stage 4 lung carcinoma (Non small cell) on brigatinib for 67months who initially presented to [facility] /2 mechanical fall. Patient was transferred to the [facility] after being found to have a right femoral head fracture.     #Right femoral head fracture 2/2 mechanical fall   -  POD #2  - Pain control: increase Morphine as needed  - Zofran for nausea   - need ortho f/u to determine need for pain management    #Diffuse Osteopenia  - Outpatient DEXA    #Conjunctivitis  - Continue on abx    #DIDI vs CKD  - No history of CKD.  - Continue on D5 1/2NS @75 cc/hour    #Stage 4 Non Small Cell Lung Cancer  - Taking Brigatinib at home, on hold while in patient  - Resume when ok with his oncologist    #HTN- On amlodpine 5mg daily and Metoprolol 25mg BID while inpatient  - Continue to monitor while inpatient.  - Home meds to be resumed on discharge    #Constipation  - Bowel regimen Colace, Senna     PT/rehab    OOB with assistance

## 2018-11-19 NOTE — PROGRESS NOTE ADULT - ASSESSMENT
This is a 78 year old male with PMHx of HTN, Anxiety, Stage 4 lung carcinoma (Non small cell) on brigatinib for 67months who initially presented to [facility] /2 mechanical fall. Patient was transferred to the [facility] after being found to have a right femoral head fracture.     # Right femoral head fracture 2/2 mechanical fall:  - Orthopedics consulted; s/p ORIF  - Pain control: Morphine 4mg IV q3 PRN for severe pain , Oxycontin 10 mg PO q 4 hrs moderate pain,  Oxycontin 5 mg PO q 4 hrs mild pain.  - Zofran for nausea     # Diffuse Osteopenia:   - Outpatient DEXA    # Conjunctivitis:   - pt refusing eye drops    # DIDI vs CKD:   - AM Cr 1.4 , will f/u    # Stage 4 Non Small Cell Lung Cancer  - Resume Brigatinib     # HTN  - On amlodipine 5mg daily and Metoprolol 25mg BID while inpatient  - Continue to monitor while inpatient.      #Constipation , rsolved  - Bowel regimen Colace, Senna     Activity: increase as tolerated  Diet: DASH  DVT ppx: Heparin SubQ  GI ppx: Protonix  Code Status: Full Code  Dispo: Pending PT / Rehab for SNF placement

## 2018-11-19 NOTE — PROGRESS NOTE ADULT - ASSESSMENT
DIDI - improved  CKD 3  fall  right hip fracture  chronic CHF   HTN  NSCLCA stage 4  BPH with elevated PVR    plan:    off ivf  may resume oupt bumex with kcl  may resume quinapril  cont norvasc   dc planning  oupt bmp, mg check   oupt renal f/u in 4 weeks

## 2018-11-20 VITALS — HEART RATE: 63 BPM | SYSTOLIC BLOOD PRESSURE: 136 MMHG | DIASTOLIC BLOOD PRESSURE: 62 MMHG

## 2018-11-20 LAB
ANION GAP SERPL CALC-SCNC: 15 MMOL/L — HIGH (ref 7–14)
BUN SERPL-MCNC: 31 MG/DL — HIGH (ref 10–20)
CALCIUM SERPL-MCNC: 8.6 MG/DL — SIGNIFICANT CHANGE UP (ref 8.5–10.1)
CHLORIDE SERPL-SCNC: 101 MMOL/L — SIGNIFICANT CHANGE UP (ref 98–110)
CO2 SERPL-SCNC: 24 MMOL/L — SIGNIFICANT CHANGE UP (ref 17–32)
CREAT SERPL-MCNC: 1.4 MG/DL — SIGNIFICANT CHANGE UP (ref 0.7–1.5)
GLUCOSE SERPL-MCNC: 94 MG/DL — SIGNIFICANT CHANGE UP (ref 70–99)
HCT VFR BLD CALC: 36.1 % — LOW (ref 42–52)
HGB BLD-MCNC: 12.1 G/DL — LOW (ref 14–18)
MCHC RBC-ENTMCNC: 28.1 PG — SIGNIFICANT CHANGE UP (ref 27–31)
MCHC RBC-ENTMCNC: 33.5 G/DL — SIGNIFICANT CHANGE UP (ref 32–37)
MCV RBC AUTO: 83.8 FL — SIGNIFICANT CHANGE UP (ref 80–94)
NRBC # BLD: 0 /100 WBCS — SIGNIFICANT CHANGE UP (ref 0–0)
PLATELET # BLD AUTO: 220 K/UL — SIGNIFICANT CHANGE UP (ref 130–400)
POTASSIUM SERPL-MCNC: 4.3 MMOL/L — SIGNIFICANT CHANGE UP (ref 3.5–5)
POTASSIUM SERPL-SCNC: 4.3 MMOL/L — SIGNIFICANT CHANGE UP (ref 3.5–5)
RBC # BLD: 4.31 M/UL — LOW (ref 4.7–6.1)
RBC # FLD: 15.7 % — HIGH (ref 11.5–14.5)
SODIUM SERPL-SCNC: 140 MMOL/L — SIGNIFICANT CHANGE UP (ref 135–146)
WBC # BLD: 10.73 K/UL — SIGNIFICANT CHANGE UP (ref 4.8–10.8)
WBC # FLD AUTO: 10.73 K/UL — SIGNIFICANT CHANGE UP (ref 4.8–10.8)

## 2018-11-20 RX ORDER — BENZOCAINE AND MENTHOL 5; 1 G/100ML; G/100ML
0 LIQUID ORAL
Qty: 0 | Refills: 0 | COMMUNITY
Start: 2018-11-20

## 2018-11-20 RX ORDER — HEPARIN SODIUM 5000 [USP'U]/ML
5000 INJECTION INTRAVENOUS; SUBCUTANEOUS
Qty: 0 | Refills: 0 | COMMUNITY
Start: 2018-11-20

## 2018-11-20 RX ORDER — BUMETANIDE 0.25 MG/ML
0 INJECTION INTRAMUSCULAR; INTRAVENOUS
Qty: 0 | Refills: 0 | COMMUNITY

## 2018-11-20 RX ORDER — PANTOPRAZOLE SODIUM 20 MG/1
1 TABLET, DELAYED RELEASE ORAL
Qty: 0 | Refills: 0 | COMMUNITY
Start: 2018-11-20

## 2018-11-20 RX ORDER — BENZOCAINE AND MENTHOL 5; 1 G/100ML; G/100ML
1 LIQUID ORAL THREE TIMES A DAY
Qty: 0 | Refills: 0 | Status: DISCONTINUED | OUTPATIENT
Start: 2018-11-20 | End: 2018-11-20

## 2018-11-20 RX ORDER — POLYETHYLENE GLYCOL 3350 17 G/17G
17 POWDER, FOR SOLUTION ORAL
Qty: 0 | Refills: 0 | COMMUNITY
Start: 2018-11-20

## 2018-11-20 RX ORDER — AMLODIPINE BESYLATE 2.5 MG/1
1 TABLET ORAL
Qty: 0 | Refills: 0 | COMMUNITY
Start: 2018-11-20

## 2018-11-20 RX ORDER — METOPROLOL TARTRATE 50 MG
1 TABLET ORAL
Qty: 0 | Refills: 0 | COMMUNITY
Start: 2018-11-20

## 2018-11-20 RX ORDER — DOCUSATE SODIUM 100 MG
1 CAPSULE ORAL
Qty: 0 | Refills: 0 | COMMUNITY
Start: 2018-11-20

## 2018-11-20 RX ORDER — MAGNESIUM HYDROXIDE 400 MG/1
30 TABLET, CHEWABLE ORAL
Qty: 0 | Refills: 0 | COMMUNITY
Start: 2018-11-20

## 2018-11-20 RX ORDER — OXYCODONE HYDROCHLORIDE 5 MG/1
1 TABLET ORAL
Qty: 0 | Refills: 0 | COMMUNITY
Start: 2018-11-20

## 2018-11-20 RX ORDER — POTASSIUM CHLORIDE 20 MEQ
0 PACKET (EA) ORAL
Qty: 0 | Refills: 0 | COMMUNITY

## 2018-11-20 RX ORDER — QUINAPRIL HYDROCHLORIDE 40 MG/1
0 TABLET, FILM COATED ORAL
Qty: 0 | Refills: 0 | COMMUNITY

## 2018-11-20 RX ADMIN — Medication 100 MILLIGRAM(S): at 17:27

## 2018-11-20 RX ADMIN — BENZOCAINE AND MENTHOL 1 LOZENGE: 5; 1 LIQUID ORAL at 17:15

## 2018-11-20 RX ADMIN — Medication 1 DROP(S): at 17:28

## 2018-11-20 RX ADMIN — Medication 1 DROP(S): at 15:04

## 2018-11-20 RX ADMIN — HEPARIN SODIUM 5000 UNIT(S): 5000 INJECTION INTRAVENOUS; SUBCUTANEOUS at 05:26

## 2018-11-20 RX ADMIN — AMLODIPINE BESYLATE 5 MILLIGRAM(S): 2.5 TABLET ORAL at 05:25

## 2018-11-20 RX ADMIN — Medication 1 DROP(S): at 12:25

## 2018-11-20 RX ADMIN — Medication 25 MILLIGRAM(S): at 17:28

## 2018-11-20 RX ADMIN — PANTOPRAZOLE SODIUM 40 MILLIGRAM(S): 20 TABLET, DELAYED RELEASE ORAL at 06:01

## 2018-11-20 RX ADMIN — HEPARIN SODIUM 5000 UNIT(S): 5000 INJECTION INTRAVENOUS; SUBCUTANEOUS at 14:24

## 2018-11-20 RX ADMIN — Medication 1 DROP(S): at 05:27

## 2018-11-20 RX ADMIN — ONDANSETRON 4 MILLIGRAM(S): 8 TABLET, FILM COATED ORAL at 05:27

## 2018-11-20 RX ADMIN — Medication 100 MILLIGRAM(S): at 05:25

## 2018-11-20 RX ADMIN — Medication 1 DROP(S): at 12:26

## 2018-11-20 RX ADMIN — Medication 25 MILLIGRAM(S): at 05:25

## 2018-11-20 NOTE — DISCHARGE NOTE ADULT - PLAN OF CARE
optimize condition 1- follow up with Dr. Oliver Vallecillo in 10 days ([phone])  2- take heparin SC 5000 units 3 times / day for 30 days

## 2018-11-20 NOTE — PROGRESS NOTE ADULT - REASON FOR ADMISSION
s/p mechanical fall

## 2018-11-20 NOTE — PROGRESS NOTE ADULT - ASSESSMENT
This is a 78 year old male with PMHx of HTN, Anxiety, Stage 4 lung carcinoma (Non small cell) on brigatinib for 67months who initially presented to [facility] /2 mechanical fall. Patient was transferred to the [facility] after being found to have a right femoral head fracture.     # Right femoral head fracture 2/2 mechanical fall:  - Orthopedics consulted; s/p ORIF  - Pain control: Morphine 4mg IV q3 PRN for severe pain , Oxycontin 10 mg PO q 4 hrs moderate pain,  Oxycontin 5 mg PO q 4 hrs mild pain.  - Zofran for nausea     # Diffuse Osteopenia:   - Outpatient DEXA    # Conjunctivitis:   - pt refusing eye drops    # DIDI vs CKD:   - AM Cr 1.4 , will f/u    # Stage 4 Non Small Cell Lung Cancer  - Resume Brigatinib   # HTN  - On amlodipine 5mg daily and Metoprolol 25mg BID while inpatient  - Continue to monitor while inpatient.      #Constipation , rsolved  - Bowel regimen Colace, Senna

## 2018-11-20 NOTE — DISCHARGE NOTE ADULT - PATIENT PORTAL LINK FT
You can access the [facility], offered by [facility], by registering with the following website: [email or web address]

## 2018-11-20 NOTE — DISCHARGE NOTE ADULT - HOSPITAL COURSE
78 years old male pt with past medical hx of HTN for 3 years, anxiety, stage 4 lung carcinoma (non small cell carcinoma) on brigatinib for 6 months transferred from [facility] s/p mechanical fall yesterday.  Patient was in usual state till yesterday morning, when he tripped while turning around in his kitchen, he denies any preceding symptoms like chest pain, palpitation, dizziness, visual symptoms. After fall, he started having pain his right hip according to him 10/10 sharp pain, non radiating, was unable to move his right leg afterward, was not able to stand or walk later.  They called ambulance at 11pm, went to [facility], transferred to [place] at 4 am  In [facility] ER, xray pelvis showed fracture of right  femoral head.  he has on /off shortness of breath with minimal exertion for last 1 years, he saw his primary and was told because of fluid in lungs, is able to walk, needs help for his daily activities.  he denies any weight changes, no cough, is feeling weak and lethargic lately, also complaints of tingling sensation in his feet bilaterally after starting chemotherapy.  he denies any fever, no nausea, no vomiting, no urinary complaints    ortho consulted  pt is S/P  right hip hemiarthroplasty  pt is cleared to be discharge to follow up with Ortho in 10 days.

## 2018-11-20 NOTE — CHART NOTE - NSCHARTNOTEFT_GEN_A_CORE
<<<RESIDENT DISCHARGE NOTE>>>     DARRICK FUENTES  MRN-[id number]    VITAL SIGNS:  T(F): 97.6 (11-20-18 @ 07:12), Max: 98.8 (11-19-18 @ 15:39)  HR: 64 (11-20-18 @ 07:12)  BP: 139/67 (11-20-18 @ 07:12)  SpO2: --      PHYSICAL EXAMINATION:  GGENERAL: NAD  HEENT: NCAT, PERRLA, EOMI  CHEST/LUNG: CTA, No wheezing, rhonchi, rales  HEART: Regular rate and rhythm; s1 s2 appreciated, No murmurs, rubs, or gallops  ABDOMEN: Soft, mildly tender, Nondistended; Bowel sounds present  EXTREMITIES: No LE edema b/l, Peripheral pulses 2+, No cyanosis, no clubbing  NERVOUS SYSTEM:  Alert & Oriented X3, Cranial nerves ii-xii grossly intact, sensation equal and intact      TEST RESULTS:                        12.1   10.73 )-----------( 220      ( 20 Nov 2018 05:25 )             36.1       11-20    140  |  101  |  31<H>  ----------------------------<  94  4.3   |  24  |  1.4    Ca    8.6      20 Nov 2018 05:25        FINAL DISCHARGE INTERVIEW:  Resident(s) Present: (Name: Dr. Freddy Dsouza), RN Present: (Name: Vikas)    DISCHARGE MEDICATION RECONCILIATION  reviewed with Attending (Name: Dr. Rai and Dr. Anne-Marie Irizarry)    DISPOSITION: SNF/ NH

## 2018-11-20 NOTE — DISCHARGE NOTE ADULT - CARE PLAN
Principal Discharge DX:	Hip fracture  Secondary Diagnosis:	Lung cancer  Secondary Diagnosis:	Hypertension Principal Discharge DX:	Hip fracture  Goal:	optimize condition  Assessment and plan of treatment:	1- follow up with Dr. Oliver Vallecillo in 10 days ([phone])  2- take heparin SC 5000 units 3 times / day for 30 days  Secondary Diagnosis:	Lung cancer  Secondary Diagnosis:	Hypertension

## 2018-11-20 NOTE — DISCHARGE NOTE ADULT - MEDICATION SUMMARY - MEDICATIONS TO TAKE
I will START or STAY ON the medications listed below when I get home from the hospital:    freetext medication  -  -- Brigatinib 90 milligram(s) by mouth once a day (at bedtime)  -- Indication: For Lung cancer    oxyCODONE 10 mg oral tablet  -- 1 tab(s) by mouth every 4 hours, As needed, Moderate Pain (4 - 6)  -- Indication: For pain    magnesium hydroxide 8% oral suspension  -- 30 milliliter(s) by mouth once a day, As needed, Constipation  -- Indication: For constipation    heparin  -- 5000 unit(s) subcutaneous 3 times a day for 30 days  -- Indication: For dvt ppx    metoprolol tartrate 25 mg oral tablet  -- 1 tab(s) by mouth 2 times a day  -- Indication: For HTN    amLODIPine 5 mg oral tablet  -- 1 tab(s) by mouth once a day  -- Indication: For HTN    bisacodyl 5 mg oral delayed release tablet  -- 1 tab(s) by mouth every 12 hours, As needed, Constipation  -- Indication: For constipation    docusate sodium 100 mg oral capsule  -- 1 cap(s) by mouth 2 times a day  -- Indication: For constipation    polyethylene glycol 3350 oral powder for reconstitution  -- 17 gram(s) by mouth 2 times a day  -- Indication: For constipation    Alunbrig  -- Indication: For Lung cancer    pantoprazole 40 mg oral delayed release tablet  -- 1 tab(s) by mouth once a day (before a meal)  -- Indication: For GI ppx I will START or STAY ON the medications listed below when I get home from the hospital:    freetext medication  -  -- Brigatinib 90 milligram(s) by mouth once a day (at bedtime)  -- Indication: For Lung cancer    oxyCODONE 10 mg oral tablet  -- 1 tab(s) by mouth every 4 hours, As needed, Moderate Pain (4 - 6)  -- Indication: For pain    magnesium hydroxide 8% oral suspension  -- 30 milliliter(s) by mouth once a day, As needed, Constipation  -- Indication: For constipation    heparin  -- 5000 unit(s) subcutaneous 3 times a day for 30 days  -- Indication: For dvt ppx    metoprolol tartrate 25 mg oral tablet  -- 1 tab(s) by mouth 2 times a day  -- Indication: For HTN    amLODIPine 5 mg oral tablet  -- 1 tab(s) by mouth once a day  -- Indication: For HTN    bisacodyl 5 mg oral delayed release tablet  -- 1 tab(s) by mouth every 12 hours, As needed, Constipation  -- Indication: For constipation    docusate sodium 100 mg oral capsule  -- 1 cap(s) by mouth 2 times a day  -- Indication: For constipation    polyethylene glycol 3350 oral powder for reconstitution  -- 17 gram(s) by mouth 2 times a day  -- Indication: For constipation    benzocaine-menthol 15 mg-3.6 mg mucous membrane lozenge  --  mucous membrane   -- Indication: For sore throat    Alunbrig  -- Indication: For Lung cancer    pantoprazole 40 mg oral delayed release tablet  -- 1 tab(s) by mouth once a day (before a meal)  -- Indication: For GI ppx

## 2018-11-20 NOTE — PROGRESS NOTE ADULT - SUBJECTIVE AND OBJECTIVE BOX
Patient was seen and examined. Spoke with RN. Chart reviewed.    No events overnight.  Vital Signs Last 24 Hrs  T(F): 97.6 (20 Nov 2018 07:12), Max: 98.8 (19 Nov 2018 15:39)  HR: 64 (20 Nov 2018 07:12) (64 - 78)  BP: 139/67 (20 Nov 2018 07:12) (106/62 - 139/67)  SpO2: --  MEDICATIONS  (STANDING):  amLODIPine   Tablet 5 milliGRAM(s) Oral daily  brigatinib 90 milliGRAM(s) 90 milliGRAM(s) Oral at bedtime  docusate sodium 100 milliGRAM(s) Oral two times a day  heparin  Injectable 5000 Unit(s) SubCutaneous every 8 hours  metoprolol tartrate 25 milliGRAM(s) Oral two times a day  ondansetron    Tablet 4 milliGRAM(s) Oral every 8 hours  pantoprazole    Tablet 40 milliGRAM(s) Oral before breakfast  polyethylene glycol 3350 17 Gram(s) Oral two times a day  senna 2 Tablet(s) Oral at bedtime  senna 2 Tablet(s) Oral at bedtime  trimethoprim/polymyxin Solution 1 Drop(s) Both EYES every 3 hours    MEDICATIONS  (PRN):  bisacodyl 5 milliGRAM(s) Oral every 12 hours PRN Constipation  magnesium hydroxide Suspension 30 milliLiter(s) Oral daily PRN Constipation  morphine  - Injectable 4 milliGRAM(s) IV Push every 3 hours PRN Severe Pain (7 - 10)  oxyCODONE    IR 10 milliGRAM(s) Oral every 4 hours PRN Moderate Pain (4 - 6)  oxyCODONE    IR 5 milliGRAM(s) Oral every 4 hours PRN Mild Pain (1 - 3)    Labs:                        12.1   10.73 )-----------( 220      ( 20 Nov 2018 05:25 )             36.1                         11.9   12.69 )-----------( 186      ( 19 Nov 2018 05:13 )             35.3     20 Nov 2018 05:25    140    |  101    |  31     ----------------------------<  94     4.3     |  24     |  1.4    19 Nov 2018 05:13    137    |  103    |  32     ----------------------------<  96     4.2     |  24     |  1.4      Ca    8.6        20 Nov 2018 05:25  Ca    8.6        19 Nov 2018 05:13              Radiology:    General: comfortable, NAD  Neurology: A&Ox3, nonfocal  Head:  Normocephalic, atraumatic  ENT:  Mucosa moist, no ulcerations  Neck:  Supple, no JVD,   Resp: CTA B/L  CV: RRR, S1S2,   GI: Soft, NT, bowel sounds  MS: No edema, + peripheral pulses, FROM all 4 extremity

## 2018-11-20 NOTE — DISCHARGE NOTE ADULT - CARE PROVIDER_API CALL
Trever Sommers), Orthopaedic Surgery  [facility] [place] [address]  Phone: [phone]  Fax: [phone]

## 2018-11-20 NOTE — PROGRESS NOTE ADULT - SUBJECTIVE AND OBJECTIVE BOX
ORTHO PROGRESS NOTE       78yMale POD # 4     S/P right hip hemiarthroplasty     Patient seen and examined at bedside . The patient is resting comfortably.     Vital Signs Last 24 Hrs  T(C): 36.4 (20 Nov 2018 07:12), Max: 37.1 (19 Nov 2018 15:39)  T(F): 97.6 (20 Nov 2018 07:12), Max: 98.8 (19 Nov 2018 15:39)  HR: 64 (20 Nov 2018 07:12) (64 - 78)  BP: 139/67 (20 Nov 2018 07:12) (106/62 - 139/67)  BP(mean): 79 (19 Nov 2018 15:39) (79 - 79)  RR: 18 (20 Nov 2018 07:12) (18 - 20)  SpO2: --                          12.1   10.73 )-----------( 220      ( 20 Nov 2018 05:25 )             36.1     11-20    140  |  101  |  31<H>  ----------------------------<  94  4.3   |  24  |  1.4    Ca    8.6      20 Nov 2018 05:25            DVT ppx : sqh    MEDICATIONS  (STANDING):  amLODIPine   Tablet 5 milliGRAM(s) Oral daily  brigatinib 90 milliGRAM(s) 90 milliGRAM(s) Oral at bedtime  docusate sodium 100 milliGRAM(s) Oral two times a day  heparin  Injectable 5000 Unit(s) SubCutaneous every 8 hours  metoprolol tartrate 25 milliGRAM(s) Oral two times a day  ondansetron    Tablet 4 milliGRAM(s) Oral every 8 hours  pantoprazole    Tablet 40 milliGRAM(s) Oral before breakfast  polyethylene glycol 3350 17 Gram(s) Oral two times a day  senna 2 Tablet(s) Oral at bedtime  senna 2 Tablet(s) Oral at bedtime  trimethoprim/polymyxin Solution 1 Drop(s) Both EYES every 3 hours    MEDICATIONS  (PRN):  bisacodyl 5 milliGRAM(s) Oral every 12 hours PRN Constipation  magnesium hydroxide Suspension 30 milliLiter(s) Oral daily PRN Constipation  morphine  - Injectable 4 milliGRAM(s) IV Push every 3 hours PRN Severe Pain (7 - 10)  oxyCODONE    IR 10 milliGRAM(s) Oral every 4 hours PRN Moderate Pain (4 - 6)  oxyCODONE    IR 5 milliGRAM(s) Oral every 4 hours PRN Mild Pain (1 - 3)      PE:   right hip dressing C/D/I          Compartments soft         NVI, SILT           A/P: 78yMale POD # 4   S/P  right hip hemiarthroplasty            OOB to Chair            Physical Therapy- TTWB            Pain control            Incentive Spirometry            DVT Prophylaxis            Discharge planning

## 2018-11-20 NOTE — DISCHARGE NOTE ADULT - MEDICATION SUMMARY - MEDICATIONS TO STOP TAKING
I will STOP taking the medications listed below when I get home from the hospital:    Bumex    potassium chloride    quinapril

## 2018-11-21 ENCOUNTER — OUTPATIENT (OUTPATIENT)
Dept: OUTPATIENT SERVICES | Facility: HOSPITAL | Age: 78
LOS: 1 days | Discharge: HOME | End: 2018-11-21

## 2018-11-21 DIAGNOSIS — R79.9 ABNORMAL FINDING OF BLOOD CHEMISTRY, UNSPECIFIED: ICD-10-CM

## 2018-11-21 DIAGNOSIS — Z98.890 OTHER SPECIFIED POSTPROCEDURAL STATES: Chronic | ICD-10-CM

## 2018-11-21 PROBLEM — C34.90 MALIGNANT NEOPLASM OF UNSPECIFIED PART OF UNSPECIFIED BRONCHUS OR LUNG: Chronic | Status: ACTIVE | Noted: 2018-11-14

## 2018-11-21 PROBLEM — I10 ESSENTIAL (PRIMARY) HYPERTENSION: Chronic | Status: ACTIVE | Noted: 2018-11-14

## 2018-11-27 DIAGNOSIS — N18.3 CHRONIC KIDNEY DISEASE, STAGE 3 (MODERATE): ICD-10-CM

## 2018-11-27 DIAGNOSIS — M85.80 OTHER SPECIFIED DISORDERS OF BONE DENSITY AND STRUCTURE, UNSPECIFIED SITE: ICD-10-CM

## 2018-11-27 DIAGNOSIS — N40.0 BENIGN PROSTATIC HYPERPLASIA WITHOUT LOWER URINARY TRACT SYMPTOMS: ICD-10-CM

## 2018-11-27 DIAGNOSIS — K59.00 CONSTIPATION, UNSPECIFIED: ICD-10-CM

## 2018-11-27 DIAGNOSIS — C79.31 SECONDARY MALIGNANT NEOPLASM OF BRAIN: ICD-10-CM

## 2018-11-27 DIAGNOSIS — C34.90 MALIGNANT NEOPLASM OF UNSPECIFIED PART OF UNSPECIFIED BRONCHUS OR LUNG: ICD-10-CM
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
## 2018-11-27 DIAGNOSIS — E86.0 DEHYDRATION: ICD-10-CM
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
## 2018-11-27 DIAGNOSIS — H10.9 UNSPECIFIED CONJUNCTIVITIS: ICD-10-CM

## 2018-11-27 DIAGNOSIS — F41.9 ANXIETY DISORDER, UNSPECIFIED: ICD-10-CM

## 2018-11-27 DIAGNOSIS — N17.9 ACUTE KIDNEY FAILURE, UNSPECIFIED: ICD-10-CM

## 2018-11-27 DIAGNOSIS — E87.5 HYPERKALEMIA: ICD-10-CM

## 2018-11-27 DIAGNOSIS — I13.0 HYPERTENSIVE HEART AND CHRONIC KIDNEY DISEASE WITH HEART FAILURE AND STAGE 1 THROUGH STAGE 4 CHRONIC KIDNEY DISEASE, OR UNSPECIFIED CHRONIC KIDNEY DISEASE: ICD-10-CM

## 2018-11-27 DIAGNOSIS — S72.011A UNSPECIFIED INTRACAPSULAR FRACTURE OF RIGHT FEMUR, INITIAL ENCOUNTER FOR CLOSED FRACTURE: ICD-10-CM

## 2018-11-27 DIAGNOSIS — I50.32 CHRONIC DIASTOLIC (CONGESTIVE) HEART FAILURE: ICD-10-CM

## 2018-11-29 ENCOUNTER — OUTPATIENT (OUTPATIENT)
Dept: OUTPATIENT SERVICES | Facility: HOSPITAL | Age: 78
LOS: 1 days | Discharge: HOME | End: 2018-11-29

## 2018-11-29 DIAGNOSIS — Z98.890 OTHER SPECIFIED POSTPROCEDURAL STATES: Chronic | ICD-10-CM

## 2018-11-29 DIAGNOSIS — N39.0 URINARY TRACT INFECTION, SITE NOT SPECIFIED: ICD-10-CM

## 2018-12-04 ENCOUNTER — OUTPATIENT (OUTPATIENT)
Dept: OUTPATIENT SERVICES | Facility: HOSPITAL | Age: 78
LOS: 1 days | Discharge: HOME | End: 2018-12-04

## 2018-12-04 DIAGNOSIS — N39.0 URINARY TRACT INFECTION, SITE NOT SPECIFIED: ICD-10-CM

## 2018-12-04 DIAGNOSIS — Z98.890 OTHER SPECIFIED POSTPROCEDURAL STATES: Chronic | ICD-10-CM

## 2018-12-07 ENCOUNTER — OUTPATIENT (OUTPATIENT)
Dept: OUTPATIENT SERVICES | Facility: HOSPITAL | Age: 78
LOS: 1 days | Discharge: HOME | End: 2018-12-07

## 2018-12-07 DIAGNOSIS — Z98.890 OTHER SPECIFIED POSTPROCEDURAL STATES: Chronic | ICD-10-CM

## 2018-12-08 DIAGNOSIS — R79.9 ABNORMAL FINDING OF BLOOD CHEMISTRY, UNSPECIFIED: ICD-10-CM

## 2018-12-27 ENCOUNTER — OUTPATIENT (OUTPATIENT)
Dept: OUTPATIENT SERVICES | Facility: HOSPITAL | Age: 78
LOS: 1 days | Discharge: HOME | End: 2018-12-27

## 2018-12-27 DIAGNOSIS — R91.8 OTHER NONSPECIFIC ABNORMAL FINDING OF LUNG FIELD: ICD-10-CM

## 2018-12-27 DIAGNOSIS — Z98.890 OTHER SPECIFIED POSTPROCEDURAL STATES: Chronic | ICD-10-CM

## 2019-10-25 ENCOUNTER — OUTPATIENT (OUTPATIENT)
Dept: OUTPATIENT SERVICES | Facility: HOSPITAL | Age: 79
LOS: 1 days | Discharge: HOME | End: 2019-10-25

## 2019-10-25 DIAGNOSIS — Z98.890 OTHER SPECIFIED POSTPROCEDURAL STATES: Chronic | ICD-10-CM

## 2019-10-25 DIAGNOSIS — R79.9 ABNORMAL FINDING OF BLOOD CHEMISTRY, UNSPECIFIED: ICD-10-CM

## 2022-06-21 ENCOUNTER — OUTPATIENT (OUTPATIENT)
Dept: OUTPATIENT SERVICES | Facility: HOSPITAL | Age: 82
LOS: 1 days | Discharge: HOME | End: 2022-06-21
Payer: MEDICARE

## 2022-06-21 DIAGNOSIS — R91.1 SOLITARY PULMONARY NODULE: ICD-10-CM

## 2022-06-21 DIAGNOSIS — Z98.890 OTHER SPECIFIED POSTPROCEDURAL STATES: Chronic | ICD-10-CM

## 2022-06-21 PROCEDURE — 71260 CT THORAX DX C+: CPT | Mod: 26,MH

## 2022-09-15 NOTE — PROGRESS NOTE ADULT - ASSESSMENT
Called mom and gave her providers message below.   Patient is currently taking a nap. Mom will wake her shortly to see how she is doing.     Cherri GUIDON, RN     DIDI - cr better  CKD 3  fall  right hip fracture  chronic CHF   HTN  NSCLCA stage 4  BPH with elevated PVR    plan:    gentle ivf while NPO  resume bumex 24-48 hours after OR  hold ace-I and kcl  cont norvasc - but hold for sbp < 120  monitor bp's  trend renal fx and lytes  OR today DIDI - cr better  CKD 3  fall  right hip fracture  chronic CHF   HTN  NSCLCA stage 4  BPH with elevated PVR    plan:    gentle ivf while NPO  resume bumex 24-48 hours after OR  hold ace-I and kcl  cont norvasc - but hold for sbp < 120  monitor bp's  trend renal fx and lytes  OR today  full code

## 2024-05-12 NOTE — PRE-OP CHECKLIST - TYPE OF SOLUTION
Time of event: 2:17 AM May 12, 2024    Description of event:  Paged by RN about lab informing that patient is positive for human rhinovirus.     Plan:  - Will update isolation precautions        Екатерина Lr DO  House Officer     NS @75

## 2024-05-21 NOTE — OCCUPATIONAL THERAPY INITIAL EVALUATION ADULT - TRANSFER SAFETY CONCERNS NOTED: SIT/STAND, REHAB EVAL
Admission losing balance/inability to maintain weight-bearing restrictions w/o assist/decreased weight-shifting ability/decreased safety awareness